# Patient Record
Sex: FEMALE | Employment: FULL TIME | URBAN - METROPOLITAN AREA
[De-identification: names, ages, dates, MRNs, and addresses within clinical notes are randomized per-mention and may not be internally consistent; named-entity substitution may affect disease eponyms.]

---

## 2023-07-06 ENCOUNTER — APPOINTMENT (OUTPATIENT)
Dept: LAB | Facility: LAB | Age: 33
End: 2023-07-06
Payer: COMMERCIAL

## 2023-10-08 ENCOUNTER — TELEPHONE (OUTPATIENT)
Dept: OBSTETRICS AND GYNECOLOGY | Facility: HOSPITAL | Age: 33
End: 2023-10-08
Payer: COMMERCIAL

## 2023-10-08 DIAGNOSIS — L70.0 CYSTIC ACNE VULGARIS: Primary | ICD-10-CM

## 2023-10-08 RX ORDER — TRETINOIN 0.25 MG/G
CREAM TOPICAL NIGHTLY
Qty: 45 G | Refills: 5 | Status: SHIPPED | OUTPATIENT
Start: 2023-10-08 | End: 2024-10-07

## 2023-10-17 ENCOUNTER — OFFICE VISIT (OUTPATIENT)
Dept: PRIMARY CARE | Facility: CLINIC | Age: 33
End: 2023-10-17
Payer: COMMERCIAL

## 2023-10-17 VITALS
BODY MASS INDEX: 21.16 KG/M2 | WEIGHT: 115 LBS | OXYGEN SATURATION: 97 % | HEIGHT: 62 IN | SYSTOLIC BLOOD PRESSURE: 91 MMHG | HEART RATE: 77 BPM | TEMPERATURE: 97.3 F | DIASTOLIC BLOOD PRESSURE: 54 MMHG

## 2023-10-17 DIAGNOSIS — Z00.00 ENCOUNTER FOR WELLNESS EXAMINATION: Primary | ICD-10-CM

## 2023-10-17 DIAGNOSIS — Z23 NEED FOR VACCINATION: ICD-10-CM

## 2023-10-17 DIAGNOSIS — Z30.011 ORAL CONTRACEPTION INITIATION: ICD-10-CM

## 2023-10-17 DIAGNOSIS — F33.41 RECURRENT MAJOR DEPRESSIVE DISORDER, IN PARTIAL REMISSION (CMS-HCC): ICD-10-CM

## 2023-10-17 PROBLEM — J45.20 MILD INTERMITTENT ASTHMA (HHS-HCC): Status: ACTIVE | Noted: 2021-06-17

## 2023-10-17 PROBLEM — I45.6 WPW (WOLFF-PARKINSON-WHITE SYNDROME): Status: RESOLVED | Noted: 2022-10-04 | Resolved: 2023-10-17

## 2023-10-17 PROBLEM — L70.9 ACNE: Status: ACTIVE | Noted: 2023-10-17

## 2023-10-17 PROBLEM — E78.00 HIGH CHOLESTEROL: Status: ACTIVE | Noted: 2023-10-17

## 2023-10-17 PROCEDURE — 1036F TOBACCO NON-USER: CPT | Performed by: INTERNAL MEDICINE

## 2023-10-17 PROCEDURE — 90471 IMMUNIZATION ADMIN: CPT | Performed by: INTERNAL MEDICINE

## 2023-10-17 PROCEDURE — 99213 OFFICE O/P EST LOW 20 MIN: CPT | Performed by: INTERNAL MEDICINE

## 2023-10-17 PROCEDURE — 99385 PREV VISIT NEW AGE 18-39: CPT | Performed by: INTERNAL MEDICINE

## 2023-10-17 PROCEDURE — 90686 IIV4 VACC NO PRSV 0.5 ML IM: CPT | Performed by: INTERNAL MEDICINE

## 2023-10-17 RX ORDER — BUPROPION HYDROCHLORIDE 150 MG/1
150 TABLET ORAL EVERY MORNING
Qty: 90 TABLET | Refills: 3 | Status: SHIPPED | OUTPATIENT
Start: 2023-10-17 | End: 2023-10-17 | Stop reason: SDUPTHER

## 2023-10-17 RX ORDER — BUPROPION HYDROCHLORIDE 300 MG/1
300 TABLET ORAL
COMMUNITY
Start: 2023-06-16 | End: 2023-10-17 | Stop reason: DRUGHIGH

## 2023-10-17 RX ORDER — ALBUTEROL SULFATE 90 UG/1
2 AEROSOL, METERED RESPIRATORY (INHALATION) EVERY 6 HOURS PRN
COMMUNITY

## 2023-10-17 RX ORDER — NORGESTIMATE AND ETHINYL ESTRADIOL 0.25-0.035
1 KIT ORAL DAILY
Qty: 84 TABLET | Refills: 4 | Status: SHIPPED | OUTPATIENT
Start: 2023-10-17 | End: 2024-10-16

## 2023-10-17 RX ORDER — BUPROPION HYDROCHLORIDE 150 MG/1
150 TABLET ORAL EVERY MORNING
Qty: 90 TABLET | Refills: 3 | Status: SHIPPED | OUTPATIENT
Start: 2023-10-17 | End: 2024-10-16

## 2023-10-17 RX ORDER — LORATADINE 10 MG/1
10 TABLET ORAL AS NEEDED
COMMUNITY

## 2023-10-17 RX ORDER — ESCITALOPRAM OXALATE 10 MG/1
10 TABLET ORAL EVERY MORNING
Qty: 90 TABLET | Refills: 3 | Status: SHIPPED | OUTPATIENT
Start: 2023-10-17 | End: 2024-10-16

## 2023-10-17 RX ORDER — ESCITALOPRAM OXALATE 10 MG/1
15 TABLET ORAL
COMMUNITY
Start: 2023-06-16 | End: 2023-10-17 | Stop reason: SDUPTHER

## 2023-10-17 NOTE — PROGRESS NOTES
Subjective   Kassy King is a 33 y.o. female who presents for New Patient Visit.  HPI  Patient is a very pleasant 33-year-old woman patient established with new PCP.  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    Past medical history includes depression, asthma/allergic rhinitis, hyperlipidemia and acne.    She has never been formally tested but is quite allergic to many animals as well as seasonal triggers.  She has an albuterol inhaler which she uses as needed, typically only when coming into contact with an animal as she is sensitive to.  When she was younger she was also on Advair and Singulair but she also had animals in the house.  Also reports suspected mild lactose intolerance.    She has a history of depression, treated since college, never formally taken off medication since remission but when she would run out she would typically have relapse.  She has been on different SSRIs in the past which seem to work but were limited by side effects of sexual function.  She did have some postpartum depression exacerbating her underlying depression and most recently has been on escitalopram 10 mg and her Wellbutrin at that time was increased to 300 mg.  She ran out and has been off of it for the past 2 weeks and thinks she may be experiencing some symptoms of depression including increased sleep, possibly more irritable.  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    She is , had an early first semester miscarriage with her first pregnancy.  Second pregnancy was mostly uncomplicated, had uncomplicated  at term.  Mild gestational hypertension was present which did not require pharmaceutical therapy in addition to the PPD.  She is still nursing/pumping, planning on continuing through 9 to 12 months.  Has Micronor prescription at home but concerned about spotting so she would rather just start combined OCP  "when she is done nursing.  Has not restarted menses postnatally.  Not planning pregnancy in the near future but would be okay with it as she does hope to have another child probably during her fellowship.    , lives at home, [place], with her  8-month-old daughter Barby.  Her  is a , works from home.  They also have a Labradoodle.  Diet is fairly healthy, enjoys running/biking/hiking although unable to do as much as she would like.  No history of any tobacco or drug use.  Light alcohol use, may be serving per week.  Objective   BP 91/54   Pulse 77   Temp 36.3 °C (97.3 °F)   Ht 1.575 m (5' 2\")   Wt 52.2 kg (115 lb)   SpO2 97%   BMI 21.03 kg/m²    Physical Exam  Gen: NAD, pleasant, A&;Ox3  HEENT: PERRL, EOMI, MMM, OP clear  Neck: supple, no thyromegaly, no JVD, normal carotid upstroke  Pulm: lungs CTAB, good air movement  CV: RRR, no m/r/g, 2+ DP pulses  Abd: NABS, soft, NT, ND no HSM  Ext: no peripheral edema  Neuro: CN II-XII intact, no focal sensory or motor deficits, normal reflexes   Assessment/Plan     High cholesterol: Without any other risk factors, reassess after reproductive years; for now focus on lifestyle/behavioral optimization    MDD: Continue escitalopram 10 mg daily, restart Wellbutrin at 150 mg daily    Contraception: Currently nursing, planning to stop in the next 1 to 3 months and prefers to switch to combined OCP instead of starting on progesterone only pill.  Prescription sent to pharmacy.    Acne: Continue topical tretinoin cream    Allergic rhinitis/reactive bronchospasm: Continue as needed antihistamine and albuterol      Health maintenance  -Mammogram: No early screening indicated  -Pap: 8/5/2022, normal, high-risk HPV negative  -DEXA  -Last colonoscopy:  , Repeat:  -Smoking history  -Counseled regarding diet and exercise  -Immunizations: Current  -Followup annually, sooner if needed   Problem List Items Addressed This Visit    None           Scott" MD Sushma

## 2023-12-27 ENCOUNTER — OFFICE VISIT (OUTPATIENT)
Dept: PRIMARY CARE | Facility: CLINIC | Age: 33
End: 2023-12-27
Payer: COMMERCIAL

## 2023-12-27 VITALS
TEMPERATURE: 97.3 F | OXYGEN SATURATION: 97 % | SYSTOLIC BLOOD PRESSURE: 125 MMHG | HEART RATE: 79 BPM | DIASTOLIC BLOOD PRESSURE: 73 MMHG

## 2023-12-27 DIAGNOSIS — H66.91 PERFORATION OF RIGHT TYMPANIC MEMBRANE DUE TO OTITIS MEDIA: Primary | ICD-10-CM

## 2023-12-27 DIAGNOSIS — H72.91 PERFORATION OF RIGHT TYMPANIC MEMBRANE DUE TO OTITIS MEDIA: Primary | ICD-10-CM

## 2023-12-27 DIAGNOSIS — J45.20 MILD INTERMITTENT ASTHMA WITHOUT COMPLICATION (HHS-HCC): ICD-10-CM

## 2023-12-27 DIAGNOSIS — M25.512 ACUTE PAIN OF LEFT SHOULDER: ICD-10-CM

## 2023-12-27 PROCEDURE — 1036F TOBACCO NON-USER: CPT | Performed by: INTERNAL MEDICINE

## 2023-12-27 PROCEDURE — 99213 OFFICE O/P EST LOW 20 MIN: CPT | Performed by: INTERNAL MEDICINE

## 2023-12-27 NOTE — PATIENT INSTRUCTIONS
Twice daily, in each nostril, spray 1-2 doses of Afrin (oxymetazoline) followed by nasal sinus wash (squeeze bottle or Bettina pot, i.e. NeilMed) followed by 1 spray in each nostril of nasal inhaled corticosteroid (i.e. Flonase, Nasacort, fluticasone, mometasone etc.)  After the first 3 days, STOP Afrin and continue the sinus wash and nasal steroid until symptoms are improved.    Tympanic membrane should heal on its own and as it does hearing should return to normal.  Call with any questions or if it does not seem to be improving.  Avoid swimming and keep a cottonball to protect excess water from getting in while bathing.    For the shoulder, ice, rest and NSAIDs for now.  Let me know if it is not improving.

## 2023-12-27 NOTE — PROGRESS NOTES
Subjective   Kassy King is a 33 y.o. female who presents for Hearing Problem and Shoulder Pain.  HPI  Developed upper respiratory infection about a week or more ago.  Initially with sore throat, cough and congestion.  On 24 December woke up with a lot of right ear pain and pressure, tried different self treatments without any success.  She went to bed and woke up with complete resolution of pain but some drainage of fluid, clear with traces of pink, from the right ear and has since had decreased hearing on that side.  No vertigo or tinnitus.  Pains and swelling have not recurred.    She also has some left shoulder pain, went to a golf range with her family and swung the club into the ground and felt something pop in and out over the lateral upper left arm/shoulder and has had persistent pain with certain motions ever since.  No visible bruising or injury, range of motion is fairly normal and minimal functional limitations.  Objective   /73   Pulse 79   Temp 36.3 °C (97.3 °F)   SpO2 97%    Physical Exam  NAD, some nasal congestion  Left otic canal and tympanic membrane are normal  Right otic canal is normal, tympanic membrane appears intact with a slight central vertical linear scar approximately 20% of the total diameter with no purulent drainage or middle ear bulging or purulence or erythema  Left shoulder exam is positive for passive arc and Neer's sign, otherwise fairly normal.  Assessment/Plan     URI with otitis media and perforation of right tympanic membrane:  -Clinically improving, recommend nasal sinus wash and Flonase for continued improvement  -No current signs of acute otitis and tympanic membrane seems to be healing  -Monitor for worsening signs or lack of hearing improvement    Left shoulder pain: Suspect tendinitis with impingement or possibly partial rotator cuff tear.  Labral tear or partial separation also possibilities although symptoms are relatively mild.  Recommend rest ice and  NSAIDs for now.    Problem List Items Addressed This Visit    None           Scott Ordaz MD

## 2023-12-30 ENCOUNTER — PATIENT MESSAGE (OUTPATIENT)
Dept: PRIMARY CARE | Facility: CLINIC | Age: 33
End: 2023-12-30
Payer: COMMERCIAL

## 2023-12-30 DIAGNOSIS — M25.512 ACUTE PAIN OF LEFT SHOULDER: Primary | ICD-10-CM

## 2024-01-15 ENCOUNTER — HOSPITAL ENCOUNTER (OUTPATIENT)
Dept: RADIOLOGY | Facility: HOSPITAL | Age: 34
Discharge: HOME | End: 2024-01-15
Payer: COMMERCIAL

## 2024-01-15 ENCOUNTER — OFFICE VISIT (OUTPATIENT)
Dept: ORTHOPEDIC SURGERY | Facility: HOSPITAL | Age: 34
End: 2024-01-15
Payer: COMMERCIAL

## 2024-01-15 DIAGNOSIS — M25.512 ACUTE PAIN OF LEFT SHOULDER: ICD-10-CM

## 2024-01-15 DIAGNOSIS — M25.512 LEFT SHOULDER PAIN, UNSPECIFIED CHRONICITY: ICD-10-CM

## 2024-01-15 PROCEDURE — 73030 X-RAY EXAM OF SHOULDER: CPT | Mod: LT

## 2024-01-15 PROCEDURE — 99213 OFFICE O/P EST LOW 20 MIN: CPT | Performed by: ORTHOPAEDIC SURGERY

## 2024-01-15 PROCEDURE — 99203 OFFICE O/P NEW LOW 30 MIN: CPT | Performed by: ORTHOPAEDIC SURGERY

## 2024-01-15 PROCEDURE — 73030 X-RAY EXAM OF SHOULDER: CPT | Mod: LEFT SIDE | Performed by: RADIOLOGY

## 2024-01-15 PROCEDURE — 1036F TOBACCO NON-USER: CPT | Performed by: ORTHOPAEDIC SURGERY

## 2024-01-15 NOTE — PROGRESS NOTES
Injection of left shoulder she has 3 weeks of left shoulder pain primarily posteriorly and anteriorly.  This started when she was at top golf and ground at the club felt her shoulder pop.  She has had pain since then.  It is partially improved.  No history of prior problems with left shoulder no neck pain no paresthesias no right shoulder symptoms.  The patient's past medical history social history and family history as well as a complete 30 point review of systems have been documented on the patient intake form and scanned into the electronic medical record.  Unless noted in the history of present illness, all other systems have been reviewed and are negative for complaints.  The patient is pleasant and cooperative.  The patient is alert and oriented ×3.  Auditory function is intact.  The patient is a good historian.  The patient is not in acute distress.  Eye exam significant for nonicteric sclera, intact ocular muscle movement.  Breathing is rhythmic symmetric and nonlabored.  Left shoulder integument intact no lesions scars laceration abrasions or contusions left radial pulse palpable no peripheral edema  strength 5/5.  Touch sensation over the shoulder arm forearm and hand all intact.  Active range of motion elevation 180 external rotation and abduction 95 with pain.  No apprehension.  External rotation at the side 60 degrees internal rotation T10.  Motor power abduction internal and external rotation 5/5.   strength 5/5.    X-rays show preservation subacromial glenohumeral space no fracture dislocation subluxation or arthritic degenerative changes.     Left shoulder strain    Patient sustained a strain injury to her shoulder.  I do not think she has dislocated but may have partially subluxed the shoulder and has sequelae associated with this.  Prognosis for this injury is excellent and should recover well.  I recommended use of ibuprofen and limited activity modification and repeat examination in 2 to  3 weeks.  This was dictated using voice recognition software and not corrected for grammatical or spelling errors.

## 2024-02-12 ENCOUNTER — APPOINTMENT (OUTPATIENT)
Dept: ORTHOPEDIC SURGERY | Facility: HOSPITAL | Age: 34
End: 2024-02-12
Payer: COMMERCIAL

## 2024-02-23 ENCOUNTER — TELEPHONE (OUTPATIENT)
Dept: OBSTETRICS AND GYNECOLOGY | Facility: HOSPITAL | Age: 34
End: 2024-02-23
Payer: COMMERCIAL

## 2024-02-23 DIAGNOSIS — L70.0 CYSTIC ACNE VULGARIS: Primary | ICD-10-CM

## 2024-02-23 RX ORDER — TRETINOIN 0.5 MG/G
1 CREAM TOPICAL NIGHTLY
Qty: 20 G | Refills: 1 | Status: SHIPPED | OUTPATIENT
Start: 2024-02-23 | End: 2025-02-22

## 2024-04-11 ENCOUNTER — LAB (OUTPATIENT)
Dept: LAB | Facility: LAB | Age: 34
End: 2024-04-11
Payer: COMMERCIAL

## 2024-04-11 LAB — COTININE UR QL SCN: NEGATIVE

## 2024-05-24 ENCOUNTER — TELEPHONE (OUTPATIENT)
Dept: OBSTETRICS AND GYNECOLOGY | Facility: HOSPITAL | Age: 34
End: 2024-05-24

## 2024-05-24 DIAGNOSIS — J02.0 STREP THROAT: Primary | ICD-10-CM

## 2024-05-24 RX ORDER — AMOXICILLIN 500 MG/1
500 TABLET, FILM COATED ORAL 2 TIMES DAILY
Qty: 20 TABLET | Refills: 0 | Status: SHIPPED | OUTPATIENT
Start: 2024-05-24 | End: 2024-06-03

## 2024-07-24 ENCOUNTER — OFFICE VISIT (OUTPATIENT)
Dept: PRIMARY CARE | Facility: CLINIC | Age: 34
End: 2024-07-24
Payer: COMMERCIAL

## 2024-07-24 VITALS
WEIGHT: 120.8 LBS | HEART RATE: 67 BPM | DIASTOLIC BLOOD PRESSURE: 68 MMHG | HEIGHT: 63 IN | OXYGEN SATURATION: 98 % | BODY MASS INDEX: 21.4 KG/M2 | SYSTOLIC BLOOD PRESSURE: 113 MMHG

## 2024-07-24 DIAGNOSIS — F33.42 RECURRENT MAJOR DEPRESSIVE DISORDER, IN FULL REMISSION (CMS-HCC): ICD-10-CM

## 2024-07-24 DIAGNOSIS — J45.20 MILD INTERMITTENT ASTHMA WITHOUT COMPLICATION (HHS-HCC): ICD-10-CM

## 2024-07-24 DIAGNOSIS — L70.9 ACNE, UNSPECIFIED ACNE TYPE: ICD-10-CM

## 2024-07-24 DIAGNOSIS — F33.42 RECURRENT MAJOR DEPRESSIVE DISORDER, IN FULL REMISSION (CMS-HCC): Primary | ICD-10-CM

## 2024-07-24 DIAGNOSIS — L70.9 ACNE, UNSPECIFIED ACNE TYPE: Primary | ICD-10-CM

## 2024-07-24 PROCEDURE — 99213 OFFICE O/P EST LOW 20 MIN: CPT | Performed by: INTERNAL MEDICINE

## 2024-07-24 PROCEDURE — 1036F TOBACCO NON-USER: CPT | Performed by: INTERNAL MEDICINE

## 2024-07-24 PROCEDURE — 3008F BODY MASS INDEX DOCD: CPT | Performed by: INTERNAL MEDICINE

## 2024-07-24 ASSESSMENT — PATIENT HEALTH QUESTIONNAIRE - PHQ9
SUM OF ALL RESPONSES TO PHQ9 QUESTIONS 1 AND 2: 0
SUM OF ALL RESPONSES TO PHQ9 QUESTIONS 1 AND 2: 0
1. LITTLE INTEREST OR PLEASURE IN DOING THINGS: NOT AT ALL
1. LITTLE INTEREST OR PLEASURE IN DOING THINGS: NOT AT ALL
2. FEELING DOWN, DEPRESSED OR HOPELESS: NOT AT ALL
2. FEELING DOWN, DEPRESSED OR HOPELESS: NOT AT ALL

## 2024-07-24 ASSESSMENT — ENCOUNTER SYMPTOMS
OCCASIONAL FEELINGS OF UNSTEADINESS: 0
DEPRESSION: 0
LOSS OF SENSATION IN FEET: 0

## 2024-07-24 NOTE — PROGRESS NOTES
Subjective   Patient ID: Kassy King is a 33 y.o. female who presents for Follow-up.      Past medical history includes depression, asthma/allergic rhinitis, hyperlipidemia and acne.     Shoulder and ear issues resolved.     She was restarted on Wellbutrin at her initial visit in OCT 2023.  Has been doing very well from a mental health perspective.  Moods have been stable, no SI/HI, no appetite or sleep habit concerns, managing her workload and home-life without any difficulty.  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  See initial visit for details of depression history including MDD and PPD.     She has never been formally tested but is quite allergic to many animals as well as seasonal triggers.  Fairly chronic rhinitis.  She has an albuterol inhaler which she uses as needed, typically only when coming into contact with an animal as she is sensitive to.  When she was younger she was also on Advair and Singulair but she also had animals in the house.  Also reports suspected mild lactose intolerance.      She is , had an early first semester miscarriage with her first pregnancy.  Second pregnancy was uncomplicated, had uncomplicated  at term.  Mild gestational hypertension was present which did not require pharmaceutical therapy and she did have some PPD.  Currently relying on timing for contraception, would be okay with pregnancy and may start trying again in the relatively near future.  Did not go back on OCP.  She is taking prenatal vitamins.     , lives at home, [place], with her  and daughter Barby.  Her  is a , works from home.  They also have a [employer].  Diet is fairly healthy, enjoys running/biking/hiking.  No history of any tobacco or drug use.  Light alcohol use, averages 0-1 servings per week.  Objective   Physical Exam    /68 (BP Location: Right arm, Patient Position: Sitting, BP Cuff Size: Adult)   Pulse 67   Ht  "1.6 m (5' 3\")   Wt 54.8 kg (120 lb 12.8 oz)   LMP 07/03/2024 (Exact Date)   SpO2 98%   BMI 21.40 kg/m²      Gen: NAD, pleasant, A&;Ox3  HEENT: PERRL, EOMI, MMM, OP clear  Neck: supple, no thyromegaly, no JVD, normal carotid upstroke  Pulm: lungs CTAB, good air movement  CV: RRR, no m/r/g, 2+ DP pulses  Abd: NABS, soft, NT, ND no HSM  Ext: no peripheral edema  Neuro: CN II-XII intact, no focal sensory or motor deficits, normal reflexes           Assessment/Plan     MDD in full remission: Continue escitalopram 10 mg daily and Wellbutrin 150 mg daily     Acne: Continue topical tretinoin cream, referred to derm for this and scarring concerns     Allergic rhinitis/reactive bronchospasm: Continue as needed antihistamine and albuterol; can also try nasal ICS for rhinitis     Health maintenance  -Mammogram: No early screening indicated  -Pap: 8/5/2022, normal, high-risk HPV negative  -DEXA  -Last colonoscopy:  , Repeat:  -Smoking history  -Counseled regarding diet and exercise  -Immunizations: Current  -Followup annually, sooner if needed       Scott Ordaz MD         "

## 2024-07-24 NOTE — PROGRESS NOTES
Subjective   Kassy King is a 33 y.o. female who presents for No chief complaint on file..  HPI    Past medical history includes depression, asthma/allergic rhinitis, hyperlipidemia and acne.    Shoulder and ear issues resolved.    She was restarted on Wellbutrin at her initial visit in OCT 2023.  Has been doing very well from a mental health perspective.  Moods have been stable, no SI/HI, no appetite or sleep habit concerns, managing her workload and home-life without any difficulty.  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  See initial visit for details of depression history including MDD and PPD.    She has never been formally tested but is quite allergic to many animals as well as seasonal triggers.  Fairly chronic rhinitis.  She has an albuterol inhaler which she uses as needed, typically only when coming into contact with an animal as she is sensitive to.  When she was younger she was also on Advair and Singulair but she also had animals in the house.  Also reports suspected mild lactose intolerance.        She is , had an early first semester miscarriage with her first pregnancy.  Second pregnancy was uncomplicated, had uncomplicated  at term.  Mild gestational hypertension was present which did not require pharmaceutical therapy and she did have some PPD.  Currently relying on timing for contraception, would be okay with pregnancy and may start trying again in the relatively near future.  Did not go back on OCP.  She is taking prenatal vitamins.    , lives at home, [place], with her  and daughter Barby.  Her  is a , works from home.  They also have a Labradoodle.  Diet is fairly healthy, enjoys running/biking/hiking.  No history of any tobacco or drug use.  Light alcohol use, averages 0-1 servings per week.  Objective   LMP 2024 (Exact Date)    Physical Exam  Gen: NAD, pleasant, A&;Ox3  HEENT: PERRL, EOMI, MMM, OP  clear  Neck: supple, no thyromegaly, no JVD, normal carotid upstroke  Pulm: lungs CTAB, good air movement  CV: RRR, no m/r/g, 2+ DP pulses  Abd: NABS, soft, NT, ND no HSM  Ext: no peripheral edema  Neuro: CN II-XII intact, no focal sensory or motor deficits, normal reflexes   Assessment/Plan     High cholesterol: Without any other risk factors, reassess after reproductive years; for now focus on lifestyle/behavioral optimization    MDD in full remission: Continue escitalopram 10 mg daily and Wellbutrin 150 mg daily    Acne: Continue topical tretinoin cream, referred to derm for this and scarring concerns    Allergic rhinitis/reactive bronchospasm: Continue as needed antihistamine and albuterol; can also try nasal ICS for rhinitis    Health maintenance  -Mammogram: No early screening indicated  -Pap: 8/5/2022, normal, high-risk HPV negative  -DEXA  -Last colonoscopy:  , Repeat:  -Smoking history  -Counseled regarding diet and exercise  -Immunizations: Current  -Followup annually, sooner if needed   Problem List Items Addressed This Visit       Acne - Primary    Relevant Orders    Referral to Dermatology            Scott Ordaz MD

## 2024-10-13 DIAGNOSIS — F33.41 RECURRENT MAJOR DEPRESSIVE DISORDER, IN PARTIAL REMISSION (CMS-HCC): ICD-10-CM

## 2024-10-14 RX ORDER — BUPROPION HYDROCHLORIDE 150 MG/1
150 TABLET ORAL EVERY MORNING
Qty: 90 TABLET | Refills: 3 | Status: SHIPPED | OUTPATIENT
Start: 2024-10-14 | End: 2025-10-14

## 2024-10-14 RX ORDER — ESCITALOPRAM OXALATE 10 MG/1
10 TABLET ORAL DAILY
Qty: 90 TABLET | Refills: 3 | Status: SHIPPED | OUTPATIENT
Start: 2024-10-14

## 2024-10-15 DIAGNOSIS — Z3A.01 7 WEEKS GESTATION OF PREGNANCY (HHS-HCC): Primary | ICD-10-CM

## 2024-10-17 ENCOUNTER — HOSPITAL ENCOUNTER (OUTPATIENT)
Dept: RADIOLOGY | Facility: CLINIC | Age: 34
Discharge: HOME | End: 2024-10-17
Payer: COMMERCIAL

## 2024-10-17 DIAGNOSIS — Z3A.01 7 WEEKS GESTATION OF PREGNANCY (HHS-HCC): ICD-10-CM

## 2024-10-17 PROCEDURE — 76801 OB US < 14 WKS SINGLE FETUS: CPT

## 2024-10-25 ENCOUNTER — TELEPHONE (OUTPATIENT)
Dept: UROLOGY | Facility: HOSPITAL | Age: 34
End: 2024-10-25
Payer: COMMERCIAL

## 2024-10-25 DIAGNOSIS — R11.2 NAUSEA AND VOMITING, UNSPECIFIED VOMITING TYPE: Primary | ICD-10-CM

## 2024-10-25 RX ORDER — ONDANSETRON 4 MG/1
8 TABLET, FILM COATED ORAL EVERY 8 HOURS PRN
Qty: 20 TABLET | Refills: 6 | Status: SHIPPED | OUTPATIENT
Start: 2024-10-25 | End: 2024-11-24

## 2024-11-04 ENCOUNTER — APPOINTMENT (OUTPATIENT)
Dept: OBSTETRICS AND GYNECOLOGY | Facility: HOSPITAL | Age: 34
End: 2024-11-04
Payer: COMMERCIAL

## 2024-11-11 ENCOUNTER — INITIAL PRENATAL (OUTPATIENT)
Dept: OBSTETRICS AND GYNECOLOGY | Facility: HOSPITAL | Age: 34
End: 2024-11-11
Payer: COMMERCIAL

## 2024-11-11 ENCOUNTER — LAB (OUTPATIENT)
Dept: LAB | Facility: LAB | Age: 34
End: 2024-11-11
Payer: COMMERCIAL

## 2024-11-11 VITALS — DIASTOLIC BLOOD PRESSURE: 78 MMHG | SYSTOLIC BLOOD PRESSURE: 122 MMHG | BODY MASS INDEX: 22.14 KG/M2 | WEIGHT: 125 LBS

## 2024-11-11 DIAGNOSIS — Z86.79 HISTORY OF POSTPARTUM GESTATIONAL HYPERTENSION: ICD-10-CM

## 2024-11-11 DIAGNOSIS — O09.299 HX OF MATERNAL LACERATION, 3RD DEGREE, CURRENTLY PREGNANT (HHS-HCC): ICD-10-CM

## 2024-11-11 DIAGNOSIS — O99.511 ASTHMA AFFECTING PREGNANCY IN FIRST TRIMESTER (HHS-HCC): ICD-10-CM

## 2024-11-11 DIAGNOSIS — J45.909 ASTHMA AFFECTING PREGNANCY IN FIRST TRIMESTER (HHS-HCC): ICD-10-CM

## 2024-11-11 DIAGNOSIS — Z3A.10 10 WEEKS GESTATION OF PREGNANCY (HHS-HCC): ICD-10-CM

## 2024-11-11 DIAGNOSIS — F32.A DEPRESSION AFFECTING PREGNANCY IN FIRST TRIMESTER, ANTEPARTUM (HHS-HCC): ICD-10-CM

## 2024-11-11 DIAGNOSIS — Z87.59 HISTORY OF POSTPARTUM GESTATIONAL HYPERTENSION: ICD-10-CM

## 2024-11-11 DIAGNOSIS — O99.341 DEPRESSION AFFECTING PREGNANCY IN FIRST TRIMESTER, ANTEPARTUM (HHS-HCC): ICD-10-CM

## 2024-11-11 DIAGNOSIS — Z86.79 HISTORY OF WOLFF-PARKINSON-WHITE (WPW) SYNDROME: Primary | ICD-10-CM

## 2024-11-11 LAB
ABO GROUP (TYPE) IN BLOOD: NORMAL
ANTIBODY SCREEN: NORMAL
ERYTHROCYTE [DISTWIDTH] IN BLOOD BY AUTOMATED COUNT: 13.1 % (ref 11.5–14.5)
EST. AVERAGE GLUCOSE BLD GHB EST-MCNC: 100 MG/DL
HBA1C MFR BLD: 5.1 %
HBV CORE AB SER QL: NONREACTIVE
HBV SURFACE AB SER-ACNC: 541.4 MIU/ML
HBV SURFACE AG SERPL QL IA: NONREACTIVE
HCT VFR BLD AUTO: 38.2 % (ref 36–46)
HCV AB SER QL: NONREACTIVE
HGB BLD-MCNC: 12.5 G/DL (ref 12–16)
HIV 1+2 AB+HIV1 P24 AG SERPL QL IA: NONREACTIVE
MCH RBC QN AUTO: 29.2 PG (ref 26–34)
MCHC RBC AUTO-ENTMCNC: 32.7 G/DL (ref 32–36)
MCV RBC AUTO: 89 FL (ref 80–100)
NRBC BLD-RTO: 0 /100 WBCS (ref 0–0)
PLATELET # BLD AUTO: 331 X10*3/UL (ref 150–450)
RBC # BLD AUTO: 4.28 X10*6/UL (ref 4–5.2)
REFLEX ADDED, ANEMIA PANEL: NORMAL
RH FACTOR (ANTIGEN D): NORMAL
RUBV IGG SERPL IA-ACNC: 0.9 IA
RUBV IGG SERPL QL IA: NORMAL
TREPONEMA PALLIDUM IGG+IGM AB [PRESENCE] IN SERUM OR PLASMA BY IMMUNOASSAY: NONREACTIVE
WBC # BLD AUTO: 9.8 X10*3/UL (ref 4.4–11.3)

## 2024-11-11 PROCEDURE — 86317 IMMUNOASSAY INFECTIOUS AGENT: CPT

## 2024-11-11 PROCEDURE — 83036 HEMOGLOBIN GLYCOSYLATED A1C: CPT

## 2024-11-11 PROCEDURE — 86780 TREPONEMA PALLIDUM: CPT

## 2024-11-11 PROCEDURE — 86901 BLOOD TYPING SEROLOGIC RH(D): CPT

## 2024-11-11 PROCEDURE — 85027 COMPLETE CBC AUTOMATED: CPT

## 2024-11-11 PROCEDURE — 87340 HEPATITIS B SURFACE AG IA: CPT

## 2024-11-11 PROCEDURE — 86900 BLOOD TYPING SEROLOGIC ABO: CPT

## 2024-11-11 PROCEDURE — 87491 CHLMYD TRACH DNA AMP PROBE: CPT | Performed by: STUDENT IN AN ORGANIZED HEALTH CARE EDUCATION/TRAINING PROGRAM

## 2024-11-11 PROCEDURE — 83021 HEMOGLOBIN CHROMOTOGRAPHY: CPT

## 2024-11-11 PROCEDURE — 86706 HEP B SURFACE ANTIBODY: CPT

## 2024-11-11 PROCEDURE — 36415 COLL VENOUS BLD VENIPUNCTURE: CPT

## 2024-11-11 PROCEDURE — 83020 HEMOGLOBIN ELECTROPHORESIS: CPT | Performed by: STUDENT IN AN ORGANIZED HEALTH CARE EDUCATION/TRAINING PROGRAM

## 2024-11-11 PROCEDURE — 0500F INITIAL PRENATAL CARE VISIT: CPT | Performed by: STUDENT IN AN ORGANIZED HEALTH CARE EDUCATION/TRAINING PROGRAM

## 2024-11-11 PROCEDURE — 87389 HIV-1 AG W/HIV-1&-2 AB AG IA: CPT

## 2024-11-11 PROCEDURE — 86850 RBC ANTIBODY SCREEN: CPT

## 2024-11-11 PROCEDURE — 86803 HEPATITIS C AB TEST: CPT

## 2024-11-11 PROCEDURE — 86704 HEP B CORE ANTIBODY TOTAL: CPT

## 2024-11-11 PROCEDURE — 87086 URINE CULTURE/COLONY COUNT: CPT | Performed by: STUDENT IN AN ORGANIZED HEALTH CARE EDUCATION/TRAINING PROGRAM

## 2024-11-11 SDOH — ECONOMIC STABILITY: FOOD INSECURITY: WITHIN THE PAST 12 MONTHS, THE FOOD YOU BOUGHT JUST DIDN'T LAST AND YOU DIDN'T HAVE MONEY TO GET MORE.: NEVER TRUE

## 2024-11-11 SDOH — ECONOMIC STABILITY: FOOD INSECURITY: WITHIN THE PAST 12 MONTHS, YOU WORRIED THAT YOUR FOOD WOULD RUN OUT BEFORE YOU GOT MONEY TO BUY MORE.: NEVER TRUE

## 2024-11-11 ASSESSMENT — PATIENT HEALTH QUESTIONNAIRE - PHQ9
SUM OF ALL RESPONSES TO PHQ9 QUESTIONS 1 & 2: 0
1. LITTLE INTEREST OR PLEASURE IN DOING THINGS: NOT AT ALL
2. FEELING DOWN, DEPRESSED OR HOPELESS: NOT AT ALL

## 2024-11-11 ASSESSMENT — EDINBURGH POSTNATAL DEPRESSION SCALE (EPDS)
I HAVE BEEN SO UNHAPPY THAT I HAVE HAD DIFFICULTY SLEEPING: NOT AT ALL
I HAVE FELT SCARED OR PANICKY FOR NO GOOD REASON: NO, NOT AT ALL
I HAVE BLAMED MYSELF UNNECESSARILY WHEN THINGS WENT WRONG: NOT VERY OFTEN
I HAVE BEEN ANXIOUS OR WORRIED FOR NO GOOD REASON: YES, VERY OFTEN
TOTAL SCORE: 6
I HAVE LOOKED FORWARD WITH ENJOYMENT TO THINGS: AS MUCH AS I EVER DID
I HAVE FELT SAD OR MISERABLE: NOT VERY OFTEN
I HAVE BEEN SO UNHAPPY THAT I HAVE BEEN CRYING: NO, NEVER
I HAVE BEEN ABLE TO LAUGH AND SEE THE FUNNY SIDE OF THINGS: AS MUCH AS I ALWAYS COULD
THINGS HAVE BEEN GETTING ON TOP OF ME: NO, MOST OF THE TIME I HAVE COPED QUITE WELL
THE THOUGHT OF HARMING MYSELF HAS OCCURRED TO ME: NEVER

## 2024-11-11 NOTE — PROGRESS NOTES
Kassy King is a 34 y.o.  female who is here for an initial OB visit.    Pregnancy notable for:  - h/o  after SROM at 38wks , 6#1oz, 3rd degree  - history of pp gHTN  - depression, on escitalopram 10mg daily & Wellbutrin 150mg daily  - mild intermittent asthma, prn antihistamine and albuterol   - WPW, s/p ablation in . Baseline EKG wnl. S/p TTE during prior pregnancy, wnl EF 60-65%, moderately dilated riaght atrium     Previous pregnancy history:   Prior : denies  History of 4th degree laceration: denies  History of shoulder dystocia: denies  History of Hypertensive disorders including pre-eclampsia or gestational hypertension: denies  History of gestational diabetes: denies  Did you have a previous baby with a GBS Infection? denies  History of hemorrhage or bleeding concerns: denies  Thyroid Disease: denies  History of chronic hypertension: denies  History of pre-existing diabetes: denies    Past med hx and past surg hx reviewed and notable for: as above  Social History     Substance and Sexual Activity   Sexual Activity Yes    Partners: Male   Occupation: UroGyn fellow at   Multivitamin with Folic acid:   PapHx: 2022 NILM HPV neg    Objective   /78   Wt 56.7 kg (125 lb)   LMP 2024   BMI 22.14 kg/m²   Physical Exam  Constitutional:       Appearance: Normal appearance.   Genitourinary:      Genitourinary Comments: BSUS with good blood flow through fetal heart and cord   HENT:      Head: Normocephalic and atraumatic.      Mouth/Throat:      Mouth: Mucous membranes are moist.   Eyes:      Extraocular Movements: Extraocular movements intact.      Conjunctiva/sclera: Conjunctivae normal.      Pupils: Pupils are equal, round, and reactive to light.   Pulmonary:      Effort: Pulmonary effort is normal.   Abdominal:      General: Abdomen is flat.      Palpations: Abdomen is soft.   Musculoskeletal:         General: Normal range of motion.      Cervical back: Normal range  of motion.   Neurological:      General: No focal deficit present.      Mental Status: She is alert.   Skin:     General: Skin is warm and dry.   Psychiatric:         Mood and Affect: Mood normal.         Behavior: Behavior normal.         Thought Content: Thought content normal.         Judgment: Judgment normal.       Orders Placed This Encounter   Procedures    Urine culture     Order Specific Question:   Release result to MyChart     Answer:   Immediate [1]    US MAC OB imaging order     Standing Status:   Future     Standing Expiration Date:   11/11/2025     Order Specific Question:   Reason for exam:     Answer:   NT     Order Specific Question:   Radiologist to Determine Optimal Study     Answer:   Yes     Order Specific Question:   Release result to MyChart     Answer:   Immediate [1]     Order Specific Question:   Is this exam part of a Research Study? If Yes, link this order to the research study     Answer:   No    C. trachomatis / N. gonorrhoeae, Amplified     Order Specific Question:   Release result to MyChart     Answer:   Immediate    CBC Anemia Panel With Reflex,Pregnancy     Standing Status:   Future     Standing Expiration Date:   11/11/2025     Order Specific Question:   Release result to MyChart     Answer:   Immediate [1]    Hemoglobin A1C     Standing Status:   Future     Standing Expiration Date:   11/11/2025     Order Specific Question:   Release result to MyChart     Answer:   Immediate [1]    Hemoglobin Identification with Path Review     Standing Status:   Future     Standing Expiration Date:   11/11/2025     Order Specific Question:   Release result to MyChart     Answer:   Immediate [1]    Hepatitis B Surface Antibody     Standing Status:   Future     Standing Expiration Date:   11/11/2025     Order Specific Question:   Release result to MyChart     Answer:   Immediate [1]    Hepatitis B surface Ag     Standing Status:   Future     Standing Expiration Date:   11/11/2025     Order  Specific Question:   Release result to MyChart     Answer:   Immediate [1]    Hepatitis B Core Antibody, Total     Standing Status:   Future     Standing Expiration Date:   11/11/2025     Order Specific Question:   Release result to MyChart     Answer:   Immediate [1]    Hepatitis C Antibody     Standing Status:   Future     Standing Expiration Date:   11/11/2025     Order Specific Question:   Release result to MyChart     Answer:   Immediate [1]    HIV 1/2 Antigen/Antibody Screen with Reflex to Confirmation     Standing Status:   Future     Standing Expiration Date:   11/11/2025     Order Specific Question:   Release result to MyChart     Answer:   Immediate [1]    Syphilis Screen with Reflex     Standing Status:   Future     Standing Expiration Date:   11/11/2025     Order Specific Question:   Release result to MyChart     Answer:   Immediate [1]    Type And Screen     Standing Status:   Future     Standing Expiration Date:   11/11/2025     Order Specific Question:   Release result to MyChart     Answer:   Immediate [1]    Rubella IgG     Standing Status:   Future     Standing Expiration Date:   11/11/2025     Order Specific Question:   Release result to MyChart     Answer:   Immediate [1]    [facility] Prequel Prenatal Screen     Standing Status:   Future     Number of Occurrences:   1     Standing Expiration Date:   11/11/2025     Order Specific Question:   Patient Ethnicity     Answer:   Northern  e.g. English, Cymro     Order Specific Question:   Pregnant     Answer:   Yes     Order Specific Question:   Due Date     Answer:   6/4/2025     Order Specific Question:   First Pregnancy     Answer:   No     Order Specific Question:   Pregnancy type     Answer:   Stout (or unknown)     Order Specific Question:   Common aneuploidy, chromosome 13, 18, 21 (Z36.0)     Answer:   Yes     Order Specific Question:   Include sex chromosome analysis     Answer:   Yes     Order Specific Question:   Microdeletions,  hernandez only     Answer:   No     Order Specific Question:   Expanded aneuploidy, hernandez only     Answer:   No     Order Specific Question:   Clinical indications     Answer:   Supervision of other normal pregnancy Z34.80, Z34.81, Z34.82, Z34.83     Order Specific Question:   Billing Information     Answer:   Bill to insurance - If possible, attach a copy of the patient's insurance card     Order Specific Question:   Relationship to Policy Owner     Answer:   Self     Order Specific Question:   Patient e-mail address     Answer:   [email or web address]     Order Specific Question:   Patient's phone number     Answer:   [phone]     Order Specific Question:   Authorized Representative     Answer:   By providing the below contact, I confirm that the patient has expressly consented to [facility] sharing the patients protected health information, including screening results and billing information, with the person listed upon request.    ECG 12 lead (Ancillary Performed)     Standing Status:   Future     Standing Expiration Date:   11/11/2025     Order Specific Question:   Reason for Exam:     Answer:   history of WPW s/p ablation     Problem List Items Addressed This Visit       History of Omari-Parkinson-White (WPW) syndrome - Primary    Overview     s/p ablation in 2010. Baseline EKG wnl. S/p TTE during prior pregnancy, wnl EF 60-65%, moderately dilated riaght atrium.  Plan for baseline EKG.          10 weeks gestation of pregnancy ([facility])    Overview     Desired provider in labor: [] CNM  [x] Physician  [x] Blood Products: [x] Yes, accepts [] No, needs counseling  [x] Initial BMI: Could not be calculated   [] Prenatal Labs: ordered 11/11  [x] Cervical Cancer Screening up to date  [] Rh status:   [x] Genetic Screening:  ordered 11/11  [] NT US: (11-13 wks), ordered 11/11  [] Baby ASA (if indicated): had pp gHTN in P2  [] Pregnancy dated by: LMP    [] Anatomy US: (19-20 wks)  [] Federal Sterilization consent  "signed (if indicated):  [] 1hr GCT at 24-28wks:  [] Rhogam (if indicated):   [] Fetal Surveillance (if indicated):  [] Tdap (27-32 wks, may be given up to 36 wks if initial window missed):   [] RSV (32-36 wks) (Sept. to end of Jan):   [] Flu Vaccine:    [] Breastfeeding:  [] Postpartum Birth control method:   [] GBS at 36 - 37 wks:  [] 39 weeks discussion of IOL vs. Expectant management:  [] Mode of delivery ( anticipated ): vaginal         Relevant Orders    ECG 12 lead (Ancillary Performed)    C. trachomatis / N. gonorrhoeae, Amplified    CBC Anemia Panel With Reflex,Pregnancy    Hemoglobin A1C    Hemoglobin Identification with Path Review    Hepatitis B Surface Antibody    Hepatitis B surface Ag    Hepatitis B Core Antibody, Total    Hepatitis C Antibody    HIV 1/2 Antigen/Antibody Screen with Reflex to Confirmation    Syphilis Screen with Reflex    Type And Screen    Urine culture    Rubella IgG    US MAC OB imaging order    [facility] Prequel Prenatal Screen    Follow Up In Obstetrics    Asthma affecting pregnancy in first trimester ([facility])    Overview     Triggered by allergies, pet dander.   prn antihistamine and albuterol.         Depression affecting pregnancy in first trimester, antepartum ([facility])    Overview     on escitalopram 10mg daily & Wellbutrin 150mg daily         Hx of maternal laceration, 3rd degree, currently pregnant ([facility])    Overview     Was told she had a \"big 2nd degree\" but she is concerned it was a 3rd.  Discussed option of having UroGyn do her perineal repair this time if that makes her feel more comfortable.          History of postpartum gestational hypertension      Prenatal labs ordered. NT imaging ordered.   Thank you for coming to your OB visit for your pregnancy. Follow up in 4 weeks.     Enedina Kay MD    "

## 2024-11-12 LAB
BACTERIA UR CULT: NORMAL
C TRACH RRNA SPEC QL NAA+PROBE: NEGATIVE
HEMOGLOBIN A2: 3.3 % (ref 2–3.5)
HEMOGLOBIN A: 96.3 % (ref 95.8–98)
HEMOGLOBIN F: 0.4 % (ref 0–2)
HEMOGLOBIN IDENTIFICATION INTERPRETATION: NORMAL
N GONORRHOEA DNA SPEC QL PROBE+SIG AMP: NEGATIVE
PATH REVIEW-HGB IDENTIFICATION: NORMAL

## 2024-11-19 LAB
COMMENTS - MP RESULT TYPE: NORMAL
SCAN RESULT: NORMAL

## 2024-11-26 ENCOUNTER — HOSPITAL ENCOUNTER (OUTPATIENT)
Dept: RADIOLOGY | Facility: CLINIC | Age: 34
Discharge: HOME | End: 2024-11-26
Payer: COMMERCIAL

## 2024-11-26 DIAGNOSIS — Z34.90 ENCOUNTER FOR SUPERVISION OF NORMAL PREGNANCY, UNSPECIFIED, UNSPECIFIED TRIMESTER: ICD-10-CM

## 2024-11-26 DIAGNOSIS — Z3A.10 10 WEEKS GESTATION OF PREGNANCY (HHS-HCC): ICD-10-CM

## 2024-11-26 PROCEDURE — 76813 OB US NUCHAL MEAS 1 GEST: CPT | Performed by: STUDENT IN AN ORGANIZED HEALTH CARE EDUCATION/TRAINING PROGRAM

## 2024-11-26 PROCEDURE — 76813 OB US NUCHAL MEAS 1 GEST: CPT

## 2024-12-09 ENCOUNTER — ROUTINE PRENATAL (OUTPATIENT)
Dept: OBSTETRICS AND GYNECOLOGY | Facility: HOSPITAL | Age: 34
End: 2024-12-09
Payer: COMMERCIAL

## 2024-12-09 VITALS — DIASTOLIC BLOOD PRESSURE: 72 MMHG | WEIGHT: 126 LBS | BODY MASS INDEX: 22.32 KG/M2 | SYSTOLIC BLOOD PRESSURE: 109 MMHG

## 2024-12-09 DIAGNOSIS — O26.892 VAGINAL DISCHARGE DURING PREGNANCY IN SECOND TRIMESTER (HHS-HCC): Primary | ICD-10-CM

## 2024-12-09 DIAGNOSIS — Z3A.14 14 WEEKS GESTATION OF PREGNANCY (HHS-HCC): ICD-10-CM

## 2024-12-09 DIAGNOSIS — N89.8 VAGINAL DISCHARGE DURING PREGNANCY IN SECOND TRIMESTER (HHS-HCC): Primary | ICD-10-CM

## 2024-12-09 PROCEDURE — 0501F PRENATAL FLOW SHEET: CPT | Performed by: STUDENT IN AN ORGANIZED HEALTH CARE EDUCATION/TRAINING PROGRAM

## 2024-12-09 RX ORDER — PHENAZOPYRIDINE HYDROCHLORIDE 200 MG/1
100 TABLET, FILM COATED ORAL ONCE AS NEEDED
Status: SHIPPED | OUTPATIENT
Start: 2024-12-09

## 2024-12-09 ASSESSMENT — ENCOUNTER SYMPTOMS
DEPRESSION: 0
OCCASIONAL FEELINGS OF UNSTEADINESS: 0
LOSS OF SENSATION IN FEET: 0

## 2024-12-09 NOTE — PROGRESS NOTES
Ob Visit  24     SUBJECTIVE      HPI: Kassy King is a 34 y.o.  at 14w5d here for RPNV.      Has been having increased watery vaginal discharge. Non-malodorous and no vaginal irritation. Not sure if it is urine, had been incontinent after last pregnancy and did PFPT with improvement.     Took oral boards since last visit, yay!     OBJECTIVE  Visit Vitals  /72   Wt 57.2 kg (126 lb)   LMP 2024   BMI 22.32 kg/m²   OB Status Pregnant   Smoking Status Never   BSA 1.59 m²      ASSESSMENT & PLAN    Kassy King is a 34 y.o.  at 14w5d here for the following concerns we addressed today:    Problem List Items Addressed This Visit       14 weeks gestation of pregnancy ([facility])    Overview     Desired provider in labor: [] CNM  [x] Physician  [x] Blood Products: [x] Yes, accepts [] No, needs counseling  [x] Initial BMI: Could not be calculated   [x] Prenatal Labs: reviewed  [x] Cervical Cancer Screening up to date  [x] Rh status: +  [x] Genetic Screening: [facility] reviewed  [x] NT US: (11-13 wks) wnl  [x] Baby ASA (if indicated): taking, had pp gHTN in P2  [x] Pregnancy dated by: LMP cw 7wk US    [] Anatomy US: (19-20 wks)  [] Federal Sterilization consent signed (if indicated):  [] 1hr GCT at 24-28wks:  [] Rhogam (if indicated):   [] Fetal Surveillance (if indicated):  [] Tdap (27-32 wks, may be given up to 36 wks if initial window missed):   [] RSV (32-36 wks) (Sept. to end of ):   [] Flu Vaccine:    [] Breastfeeding:  [] Postpartum Birth control method:   [] GBS at 36 - 37 wks:  [] 39 weeks discussion of IOL vs. Expectant management:  [x] Mode of delivery ( anticipated ): vaginal         Vaginal discharge during pregnancy in second trimester ([facility]) - Primary    Relevant Medications    phenazopyridine (Pyridium) tablet 100 mg       RTC in 4 weeks. Will trial pyridium to see if urinary leakage.     Enedina Kay MD

## 2024-12-11 ENCOUNTER — HOSPITAL ENCOUNTER (OUTPATIENT)
Dept: CARDIOLOGY | Facility: HOSPITAL | Age: 34
Discharge: HOME | End: 2024-12-11
Payer: COMMERCIAL

## 2024-12-11 DIAGNOSIS — Z3A.10 10 WEEKS GESTATION OF PREGNANCY (HHS-HCC): ICD-10-CM

## 2024-12-11 PROCEDURE — 93005 ELECTROCARDIOGRAM TRACING: CPT

## 2024-12-12 LAB
ATRIAL RATE: 59 BPM
P AXIS: 67 DEGREES
P OFFSET: 194 MS
P ONSET: 141 MS
PR INTERVAL: 156 MS
Q ONSET: 219 MS
QRS COUNT: 10 BEATS
QRS DURATION: 84 MS
QT INTERVAL: 402 MS
QTC CALCULATION(BAZETT): 397 MS
QTC FREDERICIA: 399 MS
R AXIS: 68 DEGREES
T AXIS: 36 DEGREES
T OFFSET: 420 MS
VENTRICULAR RATE: 59 BPM

## 2025-01-06 ENCOUNTER — ROUTINE PRENATAL (OUTPATIENT)
Dept: OBSTETRICS AND GYNECOLOGY | Facility: HOSPITAL | Age: 35
End: 2025-01-06
Payer: COMMERCIAL

## 2025-01-06 VITALS — BODY MASS INDEX: 22.85 KG/M2 | DIASTOLIC BLOOD PRESSURE: 68 MMHG | SYSTOLIC BLOOD PRESSURE: 104 MMHG | WEIGHT: 129 LBS

## 2025-01-06 DIAGNOSIS — Z86.79 HISTORY OF WOLFF-PARKINSON-WHITE (WPW) SYNDROME: Primary | ICD-10-CM

## 2025-01-06 DIAGNOSIS — Z3A.18 18 WEEKS GESTATION OF PREGNANCY (HHS-HCC): ICD-10-CM

## 2025-01-06 PROCEDURE — 0501F PRENATAL FLOW SHEET: CPT | Performed by: STUDENT IN AN ORGANIZED HEALTH CARE EDUCATION/TRAINING PROGRAM

## 2025-01-06 NOTE — PROGRESS NOTES
Ob Visit  25     SUBJECTIVE      HPI: Kassy King is a 34 y.o.  at 18w5d here for RPNV.  She has no contractions, no bleeding, or no LOF. Reports not yet feeling fetal movement. Patient denies cardiac symptoms. Did think her HR was a little lower than normal for her EKG but says it has been stably normal otherwise.      OBJECTIVE  Visit Vitals  /68   Wt 58.5 kg (129 lb)   LMP 2024   BMI 22.85 kg/m²   OB Status Pregnant   Smoking Status Never   BSA 1.61 m²      ASSESSMENT & PLAN    Kassy King is a 34 y.o.  at 18w5d here for the following concerns we addressed today:    Problem List Items Addressed This Visit       History of Omari-Parkinson-White (WPW) syndrome - Primary    Overview     s/p ablation in . Baseline EKG wnl. S/p TTE during prior pregnancy, wnl EF 60-65%, moderately dilated riaght atrium.  Baseline EKG in 2024 showed sinus monroe HR 59, however otherwise nl.          18 weeks gestation of pregnancy ([facility])    Overview     Desired provider in labor: [] CNM  [x] Physician  [x] Blood Products: [x] Yes, accepts [] No, needs counseling  [x] Initial BMI: Could not be calculated   [x] Prenatal Labs: reviewed  [x] Cervical Cancer Screening up to date  [x] Rh status: +  [x] Genetic Screening: [facility] reviewed  [x] NT US: (11-13 wks) wnl  [x] Baby ASA (if indicated): taking, had pp gHTN in P2  [x] Pregnancy dated by: LMP cw 7wk US    [] Anatomy US: (19-20 wks), scheduled  [] Federal Sterilization consent signed (if indicated):  [] 1hr GCT at 24-28wks:  [] Rhogam (if indicated):   [] Fetal Surveillance (if indicated):  [] Tdap (27-32 wks, may be given up to 36 wks if initial window missed):   [] RSV (32-36 wks) (Sept. to end of ):   [] Flu Vaccine: pt reports did get this year    [] Breastfeeding:  [] Postpartum Birth control method:   [] GBS at 36 - 37 wks:  [] 39 weeks discussion of IOL vs. Expectant management:  [x] Mode of delivery ( anticipated ): vaginal             RTC in 4 weeks.     Enedina Kay MD

## 2025-01-09 ENCOUNTER — HOSPITAL ENCOUNTER (OUTPATIENT)
Dept: RADIOLOGY | Facility: CLINIC | Age: 35
Discharge: HOME | End: 2025-01-09
Payer: COMMERCIAL

## 2025-01-09 DIAGNOSIS — Z03.73 FETAL ANOMALY SUSPECTED BUT NOT FOUND: ICD-10-CM

## 2025-01-09 DIAGNOSIS — Z3A.10 10 WEEKS GESTATION OF PREGNANCY (HHS-HCC): ICD-10-CM

## 2025-01-09 PROCEDURE — 76805 OB US >/= 14 WKS SNGL FETUS: CPT | Performed by: STUDENT IN AN ORGANIZED HEALTH CARE EDUCATION/TRAINING PROGRAM

## 2025-01-09 PROCEDURE — 76805 OB US >/= 14 WKS SNGL FETUS: CPT

## 2025-01-13 ENCOUNTER — APPOINTMENT (OUTPATIENT)
Dept: DERMATOLOGY | Facility: CLINIC | Age: 35
End: 2025-01-13
Payer: COMMERCIAL

## 2025-02-03 ENCOUNTER — ROUTINE PRENATAL (OUTPATIENT)
Dept: OBSTETRICS AND GYNECOLOGY | Facility: HOSPITAL | Age: 35
End: 2025-02-03
Payer: COMMERCIAL

## 2025-02-03 VITALS — WEIGHT: 130 LBS | SYSTOLIC BLOOD PRESSURE: 111 MMHG | DIASTOLIC BLOOD PRESSURE: 74 MMHG | BODY MASS INDEX: 23.03 KG/M2

## 2025-02-03 DIAGNOSIS — Z3A.22 22 WEEKS GESTATION OF PREGNANCY (HHS-HCC): Primary | ICD-10-CM

## 2025-02-03 PROBLEM — O26.892 VAGINAL DISCHARGE DURING PREGNANCY IN SECOND TRIMESTER (HHS-HCC): Status: RESOLVED | Noted: 2024-12-09 | Resolved: 2025-02-03

## 2025-02-03 PROBLEM — N89.8 VAGINAL DISCHARGE DURING PREGNANCY IN SECOND TRIMESTER (HHS-HCC): Status: RESOLVED | Noted: 2024-12-09 | Resolved: 2025-02-03

## 2025-02-03 PROCEDURE — 0501F PRENATAL FLOW SHEET: CPT | Performed by: STUDENT IN AN ORGANIZED HEALTH CARE EDUCATION/TRAINING PROGRAM

## 2025-02-03 NOTE — PROGRESS NOTES
Ob Visit  25     SUBJECTIVE      HPI: Kassy King is a 34 y.o.  at 22w5d here for RPNV.  She has no contractions, no bleeding, or LOF. Reports normal fetal movement. Patient reports feeling bigger this time than last, feels normal. No acute concerns today.  Going on a international family trip in .      OBJECTIVE  Visit Vitals  /74   Wt 59 kg (130 lb)   LMP 2024   BMI 23.03 kg/m²   OB Status Pregnant   Smoking Status Never   BSA 1.62 m²        ASSESSMENT & PLAN    Kassy King is a 34 y.o.  at 22w5d here for the following concerns we addressed today:    Problem List Items Addressed This Visit       22 weeks gestation of pregnancy ([facility]) - Primary    Overview     Desired provider in labor: [] CNM  [x] Physician  [x] Blood Products: [x] Yes, accepts [] No, needs counseling  [x] Initial BMI: Could not be calculated   [x] Prenatal Labs: reviewed  [x] Cervical Cancer Screening up to date  [x] Rh status: +  [x] Genetic Screening: [facility] reviewed  [x] NT US: (11-13 wks) wnl  [x] Baby ASA (if indicated): taking, had pp gHTN in P2  [x] Pregnancy dated by: LMP cw 7wk US    [x] Anatomy US: (19-20 wks) reviewed  [] Federal Sterilization consent signed (if indicated):  [] 1hr GCT at 24-28wks:  [] Rhogam (if indicated):   [] Fetal Surveillance (if indicated):  [] Tdap (27-32 wks, may be given up to 36 wks if initial window missed):   [] RSV (32-36 wks) (Sept. to end of ):   [x] Flu Vaccine: pt reports did get this year    [] Breastfeeding:  [] Postpartum Birth control method:   [] GBS at 36 - 37 wks:  [] 39 weeks discussion of IOL vs. Expectant management:  [x] Mode of delivery ( anticipated ): vaginal            RTC in 4 weeks.     Enedina Kay MD

## 2025-03-03 ENCOUNTER — APPOINTMENT (OUTPATIENT)
Dept: OBSTETRICS AND GYNECOLOGY | Facility: HOSPITAL | Age: 35
End: 2025-03-03
Payer: COMMERCIAL

## 2025-03-24 ENCOUNTER — ROUTINE PRENATAL (OUTPATIENT)
Dept: OBSTETRICS AND GYNECOLOGY | Facility: HOSPITAL | Age: 35
End: 2025-03-24
Payer: COMMERCIAL

## 2025-03-24 ENCOUNTER — APPOINTMENT (OUTPATIENT)
Dept: LAB | Facility: HOSPITAL | Age: 35
End: 2025-03-24
Payer: COMMERCIAL

## 2025-03-24 VITALS — WEIGHT: 140 LBS | DIASTOLIC BLOOD PRESSURE: 68 MMHG | BODY MASS INDEX: 24.8 KG/M2 | SYSTOLIC BLOOD PRESSURE: 102 MMHG

## 2025-03-24 DIAGNOSIS — R10.2 PELVIC PAIN AFFECTING PREGNANCY IN THIRD TRIMESTER, ANTEPARTUM (HHS-HCC): ICD-10-CM

## 2025-03-24 DIAGNOSIS — O99.013 ANEMIA AFFECTING PREGNANCY IN THIRD TRIMESTER: Primary | ICD-10-CM

## 2025-03-24 DIAGNOSIS — Z3A.29 29 WEEKS GESTATION OF PREGNANCY (HHS-HCC): Primary | ICD-10-CM

## 2025-03-24 DIAGNOSIS — O26.893 PELVIC PAIN AFFECTING PREGNANCY IN THIRD TRIMESTER, ANTEPARTUM (HHS-HCC): ICD-10-CM

## 2025-03-24 DIAGNOSIS — O26.843 FUNDAL HEIGHT LOW FOR DATES IN THIRD TRIMESTER (HHS-HCC): ICD-10-CM

## 2025-03-24 LAB
ABO GROUP (TYPE) IN BLOOD: NORMAL
ANTIBODY SCREEN: NORMAL
ERYTHROCYTE [DISTWIDTH] IN BLOOD BY AUTOMATED COUNT: 11.9 % (ref 11.5–14.5)
FERRITIN SERPL-MCNC: <8 NG/ML
FOLATE SERPL-MCNC: 19.3 NG/ML
HCT VFR BLD AUTO: 34.1 % (ref 36–46)
HGB BLD-MCNC: 10.7 G/DL (ref 12–16)
IRON SATN MFR SERPL: NORMAL %
IRON SERPL-MCNC: 39 UG/DL
MCH RBC QN AUTO: 27.3 PG (ref 26–34)
MCHC RBC AUTO-ENTMCNC: 31.4 G/DL (ref 32–36)
MCV RBC AUTO: 87 FL (ref 80–100)
NRBC BLD-RTO: 0 /100 WBCS (ref 0–0)
PLATELET # BLD AUTO: 315 X10*3/UL (ref 150–450)
RBC # BLD AUTO: 3.92 X10*6/UL (ref 4–5.2)
REFLEX ADDED, ANEMIA PANEL: NORMAL
RH FACTOR (ANTIGEN D): NORMAL
TIBC SERPL-MCNC: NORMAL UG/DL
UIBC SERPL-MCNC: >450 UG/DL
VIT B12 SERPL-MCNC: 231 PG/ML
WBC # BLD AUTO: 9.2 X10*3/UL (ref 4.4–11.3)

## 2025-03-24 PROCEDURE — 0501F PRENATAL FLOW SHEET: CPT | Performed by: STUDENT IN AN ORGANIZED HEALTH CARE EDUCATION/TRAINING PROGRAM

## 2025-03-24 PROCEDURE — 83550 IRON BINDING TEST: CPT

## 2025-03-24 PROCEDURE — 82607 VITAMIN B-12: CPT

## 2025-03-24 PROCEDURE — 85027 COMPLETE CBC AUTOMATED: CPT

## 2025-03-24 PROCEDURE — 86850 RBC ANTIBODY SCREEN: CPT

## 2025-03-24 PROCEDURE — 90471 IMMUNIZATION ADMIN: CPT | Performed by: STUDENT IN AN ORGANIZED HEALTH CARE EDUCATION/TRAINING PROGRAM

## 2025-03-24 PROCEDURE — 82728 ASSAY OF FERRITIN: CPT

## 2025-03-24 PROCEDURE — 86901 BLOOD TYPING SEROLOGIC RH(D): CPT

## 2025-03-24 PROCEDURE — 82746 ASSAY OF FOLIC ACID SERUM: CPT

## 2025-03-24 PROCEDURE — 86900 BLOOD TYPING SEROLOGIC ABO: CPT

## 2025-03-24 PROCEDURE — 36415 COLL VENOUS BLD VENIPUNCTURE: CPT

## 2025-03-24 RX ORDER — FERROUS SULFATE 325(65) MG
325 TABLET ORAL
Qty: 30 TABLET | Refills: 11 | Status: SHIPPED | OUTPATIENT
Start: 2025-03-24 | End: 2026-03-24

## 2025-03-24 NOTE — PROGRESS NOTES
Ob Visit  25     SUBJECTIVE      HPI: Kassy King is a 34 y.o.  at 29w5d here for RPNV.  She has no contractions, bleeding, or LOF. Reports normal fetal movement.     Gender reveal is Friday!    OBJECTIVE  Visit Vitals  /68   Wt 63.5 kg (140 lb)   LMP 2024   BMI 24.80 kg/m²   OB Status Pregnant   Smoking Status Never   BSA 1.68 m²        ASSESSMENT & PLAN    Kassy King is a 34 y.o.  at 29w5d here for the following concerns we addressed today:    Problem List Items Addressed This Visit       29 weeks gestation of pregnancy ([facility]) - Primary    Overview     Desired provider in labor: [] CNM  [x] Physician  [x] Blood Products: [x] Yes, accepts [] No, needs counseling  [x] Initial BMI: Could not be calculated   [x] Prenatal Labs: reviewed  [x] Cervical Cancer Screening up to date  [x] Rh status: +  [x] Genetic Screening: [facility] reviewed  [x] NT US: (11-13 wks) wnl  [x] Baby ASA (if indicated): taking, had pp gHTN in P2  [x] Pregnancy dated by: LMP cw 7wk US    [x] Anatomy US: (19-20 wks) reviewed  [] Federal Sterilization consent signed (if indicated):  [] 1hr GCT at 24-28wks: ordered 3/24  [] Rhogam (if indicated):   [] Fetal Surveillance (if indicated):  [x] Tdap (27-32 wks, may be given up to 36 wks if initial window missed): 3/24  [x] Flu Vaccine: pt reports did get this year    [] Breastfeeding:  [] Postpartum Birth control method:   [] GBS at 36 - 37 wks:  [x] 39 weeks discussion of IOL vs. Expectant management: went into labor at 38wks previously, but would want rrIOL at 39wks if does not this time  [x] Mode of delivery ( anticipated ): vaginal         Relevant Orders    CBC Anemia Panel With Reflex,Pregnancy    Glucose, 1 Hour Screen, Pregnancy    Syphilis Screen with Reflex    Type And Screen Is this order related to pregnancy or an upcoming surgery? Yes; Where will this surgery/delivery be performed? [facility] What is the date of the surgery? 2025;  Has this patient ever had a transfusion? No; Has th...    US MAC OB imaging order    Fundal height low for dates in third trimester ([facility])    Overview     29wks measuring 24wks fundal height. Had IUGR baby in P1, however likely constitutionally small given maternal & paternal size.          Relevant Orders    US MAC OB imaging order    Pelvic pain affecting pregnancy in third trimester, antepartum ([facility])    Overview     Similar to previous pregnancy, open to PT.          Relevant Orders    Referral to Physical Therapy       RTC in 2 weeks    Enedina Kay MD

## 2025-03-27 LAB
GLUCOSE 1H P 50 G GLC PO SERPL-MCNC: 106 MG/DL
T PALLIDUM AB SER QL IA: NEGATIVE

## 2025-03-31 DIAGNOSIS — O99.713 PRURITUS OF PREGNANCY IN THIRD TRIMESTER (HHS-HCC): Primary | ICD-10-CM

## 2025-03-31 DIAGNOSIS — L29.9 PRURITUS OF PREGNANCY IN THIRD TRIMESTER (HHS-HCC): Primary | ICD-10-CM

## 2025-04-06 LAB
ALBUMIN SERPL-MCNC: 3.7 G/DL (ref 3.6–5.1)
ALP SERPL-CCNC: 74 U/L (ref 31–125)
ALT SERPL-CCNC: 10 U/L (ref 6–29)
ANION GAP SERPL CALCULATED.4IONS-SCNC: 8 MMOL/L (CALC) (ref 7–17)
AST SERPL-CCNC: 15 U/L (ref 10–30)
BILE AC SERPL-SCNC: 8 UMOL/L (ref 0–10)
BILIRUB SERPL-MCNC: 0.3 MG/DL (ref 0.2–1.2)
BUN SERPL-MCNC: 8 MG/DL (ref 7–25)
CALCIUM SERPL-MCNC: 9.1 MG/DL (ref 8.6–10.2)
CHLORIDE SERPL-SCNC: 103 MMOL/L (ref 98–110)
CO2 SERPL-SCNC: 26 MMOL/L (ref 20–32)
CREAT SERPL-MCNC: 0.45 MG/DL (ref 0.5–0.97)
EGFRCR SERPLBLD CKD-EPI 2021: 130 ML/MIN/1.73M2
GLUCOSE SERPL-MCNC: 72 MG/DL (ref 65–99)
POTASSIUM SERPL-SCNC: 4.2 MMOL/L (ref 3.5–5.3)
PROT SERPL-MCNC: 6.8 G/DL (ref 6.1–8.1)
SODIUM SERPL-SCNC: 137 MMOL/L (ref 135–146)

## 2025-04-07 ENCOUNTER — APPOINTMENT (OUTPATIENT)
Dept: OBSTETRICS AND GYNECOLOGY | Facility: HOSPITAL | Age: 35
End: 2025-04-07
Payer: COMMERCIAL

## 2025-04-11 ENCOUNTER — HOSPITAL ENCOUNTER (OUTPATIENT)
Dept: RADIOLOGY | Facility: HOSPITAL | Age: 35
Discharge: HOME | End: 2025-04-11
Payer: COMMERCIAL

## 2025-04-11 DIAGNOSIS — Z3A.29 29 WEEKS GESTATION OF PREGNANCY (HHS-HCC): ICD-10-CM

## 2025-04-11 DIAGNOSIS — O26.843 FUNDAL HEIGHT LOW FOR DATES IN THIRD TRIMESTER (HHS-HCC): ICD-10-CM

## 2025-04-11 PROBLEM — O40.3XX0 POLYHYDRAMNIOS IN THIRD TRIMESTER (HHS-HCC): Status: ACTIVE | Noted: 2025-04-11

## 2025-04-11 PROCEDURE — 76819 FETAL BIOPHYS PROFIL W/O NST: CPT

## 2025-04-11 PROCEDURE — 76816 OB US FOLLOW-UP PER FETUS: CPT

## 2025-04-18 ENCOUNTER — HOSPITAL ENCOUNTER (OUTPATIENT)
Dept: RADIOLOGY | Facility: HOSPITAL | Age: 35
Discharge: HOME | End: 2025-04-18
Payer: COMMERCIAL

## 2025-04-18 DIAGNOSIS — Z3A.29 29 WEEKS GESTATION OF PREGNANCY (HHS-HCC): ICD-10-CM

## 2025-04-18 DIAGNOSIS — O26.843 FUNDAL HEIGHT LOW FOR DATES IN THIRD TRIMESTER (HHS-HCC): ICD-10-CM

## 2025-04-18 PROBLEM — O35.8XX0 UMBILICAL VEIN ABNORMALITY AFFECTING PREGNANCY: Status: ACTIVE | Noted: 2025-04-18

## 2025-04-18 PROCEDURE — 76999 ECHO EXAMINATION PROCEDURE: CPT

## 2025-04-18 PROCEDURE — 76815 OB US LIMITED FETUS(S): CPT

## 2025-04-23 ENCOUNTER — HOSPITAL ENCOUNTER (OUTPATIENT)
Dept: RADIOLOGY | Facility: HOSPITAL | Age: 35
Discharge: HOME | End: 2025-04-23
Payer: COMMERCIAL

## 2025-04-23 DIAGNOSIS — Z3A.29 29 WEEKS GESTATION OF PREGNANCY (HHS-HCC): ICD-10-CM

## 2025-04-23 DIAGNOSIS — O26.843 FUNDAL HEIGHT LOW FOR DATES IN THIRD TRIMESTER (HHS-HCC): ICD-10-CM

## 2025-04-23 PROCEDURE — 76819 FETAL BIOPHYS PROFIL W/O NST: CPT

## 2025-04-23 PROCEDURE — 76815 OB US LIMITED FETUS(S): CPT

## 2025-05-01 ENCOUNTER — HOSPITAL ENCOUNTER (OUTPATIENT)
Dept: RADIOLOGY | Facility: HOSPITAL | Age: 35
Discharge: HOME | End: 2025-05-01
Payer: COMMERCIAL

## 2025-05-01 DIAGNOSIS — Z3A.29 29 WEEKS GESTATION OF PREGNANCY (HHS-HCC): ICD-10-CM

## 2025-05-01 DIAGNOSIS — O26.843 FUNDAL HEIGHT LOW FOR DATES IN THIRD TRIMESTER (HHS-HCC): ICD-10-CM

## 2025-05-01 PROCEDURE — 76999 ECHO EXAMINATION PROCEDURE: CPT

## 2025-05-01 PROCEDURE — 76816 OB US FOLLOW-UP PER FETUS: CPT

## 2025-05-05 ENCOUNTER — ROUTINE PRENATAL (OUTPATIENT)
Dept: OBSTETRICS AND GYNECOLOGY | Facility: HOSPITAL | Age: 35
End: 2025-05-05
Payer: COMMERCIAL

## 2025-05-05 VITALS — BODY MASS INDEX: 26.57 KG/M2 | WEIGHT: 150 LBS | SYSTOLIC BLOOD PRESSURE: 119 MMHG | DIASTOLIC BLOOD PRESSURE: 77 MMHG

## 2025-05-05 DIAGNOSIS — Z3A.35 35 WEEKS GESTATION OF PREGNANCY (HHS-HCC): Primary | ICD-10-CM

## 2025-05-05 PROCEDURE — 0501F PRENATAL FLOW SHEET: CPT | Performed by: STUDENT IN AN ORGANIZED HEALTH CARE EDUCATION/TRAINING PROGRAM

## 2025-05-05 NOTE — PROGRESS NOTES
Ob Visit  25     SUBJECTIVE      HPI: Kassy King is a 34 y.o.  at 35w5d here for RPNV.  She has many contractions, no bleeding or LOF. Reports normal fetal movement.        OBJECTIVE  Visit Vitals  /77   Wt 68 kg (150 lb)   LMP 2024   BMI 26.57 kg/m²   OB Status Pregnant   Smoking Status Never   BSA 1.74 m²        ASSESSMENT & PLAN    Kassy King is a 34 y.o.  at 35w5d here for the following concerns we addressed today:    Problem List Items Addressed This Visit       35 weeks gestation of pregnancy ([facility]) - Primary    Overview   Desired provider in labor: [] CNM  [x] Physician  [x] Blood Products: [x] Yes, accepts [] No, needs counseling  [x] Initial BMI: Could not be calculated   [x] Prenatal Labs: reviewed  [x] Cervical Cancer Screening up to date  [x] Rh status: +  [x] Genetic Screening: [facility] reviewed  [x] NT US: (11-13 wks) wnl  [x] Baby ASA (if indicated): taking, had pp gHTN in P2  [x] Pregnancy dated by: LMP cw 7wk US    [x] Anatomy US: (19-20 wks) reviewed  [] Federal Sterilization consent signed (if indicated):  [x] 1hr GCT at 24-28wks: 106  [x] Fetal Surveillance (if indicated): weekly BPPs for umbilical vein varix  [x] Tdap (27-32 wks, may be given up to 36 wks if initial window missed): 3/24  [x] Flu Vaccine: pt reports did get this year    [] Breastfeeding:  [] Postpartum Birth control method:   [x] GBS at 36 - 37 wks: collected   [x] 39 weeks discussion of IOL vs. Expectant management: went into labor at 38wks previously, but would want rrIOL at 39wks if does not this time  [x] Mode of delivery ( anticipated ): vaginal         Relevant Orders    Group B Streptococcus (GBS) Prenatal Screen, Culture       RTC in 2 weeks.     Enedina Kay MD

## 2025-05-08 LAB — GP B STREP SPEC QL CULT: NORMAL

## 2025-05-09 ENCOUNTER — APPOINTMENT (OUTPATIENT)
Dept: RADIOLOGY | Facility: HOSPITAL | Age: 35
End: 2025-05-09
Payer: COMMERCIAL

## 2025-05-09 ENCOUNTER — HOSPITAL ENCOUNTER (OUTPATIENT)
Dept: RADIOLOGY | Facility: HOSPITAL | Age: 35
Discharge: HOME | End: 2025-05-09
Payer: COMMERCIAL

## 2025-05-09 DIAGNOSIS — Z3A.29 29 WEEKS GESTATION OF PREGNANCY (HHS-HCC): ICD-10-CM

## 2025-05-09 DIAGNOSIS — O26.843 FUNDAL HEIGHT LOW FOR DATES IN THIRD TRIMESTER (HHS-HCC): ICD-10-CM

## 2025-05-09 PROCEDURE — 76819 FETAL BIOPHYS PROFIL W/O NST: CPT

## 2025-05-09 PROCEDURE — 76815 OB US LIMITED FETUS(S): CPT

## 2025-05-15 ENCOUNTER — HOSPITAL ENCOUNTER (OUTPATIENT)
Dept: RADIOLOGY | Facility: HOSPITAL | Age: 35
Discharge: HOME | End: 2025-05-15
Payer: COMMERCIAL

## 2025-05-15 DIAGNOSIS — O26.843 FUNDAL HEIGHT LOW FOR DATES IN THIRD TRIMESTER (HHS-HCC): ICD-10-CM

## 2025-05-15 DIAGNOSIS — Z3A.29 29 WEEKS GESTATION OF PREGNANCY (HHS-HCC): ICD-10-CM

## 2025-05-15 PROCEDURE — 76815 OB US LIMITED FETUS(S): CPT

## 2025-05-15 PROCEDURE — 76819 FETAL BIOPHYS PROFIL W/O NST: CPT

## 2025-05-19 ENCOUNTER — ROUTINE PRENATAL (OUTPATIENT)
Dept: OBSTETRICS AND GYNECOLOGY | Facility: HOSPITAL | Age: 35
End: 2025-05-19
Payer: COMMERCIAL

## 2025-05-19 VITALS
HEART RATE: 86 BPM | SYSTOLIC BLOOD PRESSURE: 150 MMHG | BODY MASS INDEX: 27.35 KG/M2 | WEIGHT: 154.4 LBS | DIASTOLIC BLOOD PRESSURE: 88 MMHG

## 2025-05-19 DIAGNOSIS — O16.3 ELEVATED BLOOD PRESSURE AFFECTING PREGNANCY IN THIRD TRIMESTER, ANTEPARTUM: Primary | ICD-10-CM

## 2025-05-19 DIAGNOSIS — Z3A.37 37 WEEKS GESTATION OF PREGNANCY (HHS-HCC): ICD-10-CM

## 2025-05-19 PROCEDURE — 0501F PRENATAL FLOW SHEET: CPT | Performed by: STUDENT IN AN ORGANIZED HEALTH CARE EDUCATION/TRAINING PROGRAM

## 2025-05-19 RX ORDER — ACETAMINOPHEN 500 MG
1 TABLET ORAL DAILY
Qty: 1 KIT | Refills: 0 | Status: SHIPPED | OUTPATIENT
Start: 2025-05-19

## 2025-05-19 ASSESSMENT — PAIN SCALES - GENERAL: PAINLEVEL_OUTOF10: 0-NO PAIN

## 2025-05-19 NOTE — PROGRESS NOTES
Ob Visit  25     SUBJECTIVE      HPI: Kassy King is a 34 y.o.  at 37w5d here for RPNV.  She has contractions, no bleeding or LOF. Reports normal fetal movement.      OBJECTIVE  Visit Vitals  /88 (BP Location: Left arm, Patient Position: Sitting, BP Cuff Size: Adult)   Pulse 86   Wt 70 kg (154 lb 6.4 oz)   LMP 2024   BMI 27.35 kg/m²   OB Status Pregnant   Smoking Status Never   BSA 1.76 m²        ASSESSMENT & PLAN    Kassy King is a 34 y.o.  at 37w5d here for the following concerns we addressed today:    Problem List Items Addressed This Visit       37 weeks gestation of pregnancy ([facility])    Overview   Desired provider in labor: [] CNM  [x] Physician  [x] Blood Products: [x] Yes, accepts [] No, needs counseling  [x] Initial BMI: Could not be calculated   [x] Prenatal Labs: reviewed  [x] Cervical Cancer Screening up to date  [x] Rh status: +  [x] Genetic Screening: [facility] reviewed  [x] NT US: (11-13 wks) wnl  [x] Baby ASA (if indicated): taking, had pp gHTN in P2  [x] Pregnancy dated by: LMP cw 7wk US    [x] Anatomy US: (19-20 wks) reviewed  [] Federal Sterilization consent signed (if indicated):  [x] 1hr GCT at 24-28wks: 106  [x] Fetal Surveillance (if indicated): weekly BPPs for umbilical vein varix  [x] Tdap (27-32 wks, may be given up to 36 wks if initial window missed): 3/24  [x] Flu Vaccine: pt reports did get this year    [] Breastfeeding:  [] Postpartum Birth control method:   [x] GBS at 36 - 37 wks: neg  [x] 39 weeks discussion of IOL vs. Expectant management: will do IOL for elevated BP, timing pending scheduling  [x] Mode of delivery ( anticipated ): vaginal         Elevated blood pressure affecting pregnancy in third trimester, antepartum - Primary    Overview   /88 at 37.5wga office visit.          Relevant Medications    blood pressure monitor (Blood Pressure Kit) kit         Patient to contact me tomorrow with BP reading. 2.5cm dilated, will plan for  pitcoin/AROM IOL around childcare scheduling. If pressures normal, IOL on 5/29.     Enedina Kay MD

## 2025-05-20 ENCOUNTER — ANESTHESIA (OUTPATIENT)
Dept: OBSTETRICS AND GYNECOLOGY | Facility: HOSPITAL | Age: 35
End: 2025-05-20
Payer: COMMERCIAL

## 2025-05-20 ENCOUNTER — HOSPITAL ENCOUNTER (INPATIENT)
Facility: HOSPITAL | Age: 35
LOS: 2 days | Discharge: HOME | End: 2025-05-22
Attending: SPECIALIST | Admitting: STUDENT IN AN ORGANIZED HEALTH CARE EDUCATION/TRAINING PROGRAM
Payer: COMMERCIAL

## 2025-05-20 ENCOUNTER — ANESTHESIA EVENT (OUTPATIENT)
Dept: OBSTETRICS AND GYNECOLOGY | Facility: HOSPITAL | Age: 35
End: 2025-05-20
Payer: COMMERCIAL

## 2025-05-20 PROBLEM — D64.9 ANEMIA: Status: ACTIVE | Noted: 2025-05-20

## 2025-05-20 PROBLEM — J06.9 RECENT URI: Status: ACTIVE | Noted: 2025-05-20

## 2025-05-20 PROBLEM — G62.9 PERIPHERAL NEUROPATHY: Status: ACTIVE | Noted: 2025-05-20

## 2025-05-20 LAB
ABO GROUP (TYPE) IN BLOOD: NORMAL
ALBUMIN SERPL BCP-MCNC: 3.4 G/DL (ref 3.4–5)
ALP SERPL-CCNC: 116 U/L (ref 33–110)
ALT SERPL W P-5'-P-CCNC: 10 U/L (ref 7–45)
ANION GAP SERPL CALC-SCNC: 13 MMOL/L (ref 10–20)
ANTIBODY SCREEN: NORMAL
AST SERPL W P-5'-P-CCNC: 12 U/L (ref 9–39)
BILIRUB SERPL-MCNC: 0.3 MG/DL (ref 0–1.2)
BUN SERPL-MCNC: 10 MG/DL (ref 6–23)
CALCIUM SERPL-MCNC: 8.9 MG/DL (ref 8.6–10.6)
CHLORIDE SERPL-SCNC: 102 MMOL/L (ref 98–107)
CO2 SERPL-SCNC: 25 MMOL/L (ref 21–32)
CREAT SERPL-MCNC: 0.53 MG/DL (ref 0.5–1.05)
CREAT UR-MCNC: 125.6 MG/DL (ref 20–320)
EGFRCR SERPLBLD CKD-EPI 2021: >90 ML/MIN/1.73M*2
ERYTHROCYTE [DISTWIDTH] IN BLOOD BY AUTOMATED COUNT: 13.2 % (ref 11.5–14.5)
GLUCOSE SERPL-MCNC: 85 MG/DL (ref 74–99)
HCT VFR BLD AUTO: 30.1 % (ref 36–46)
HGB BLD-MCNC: 9.2 G/DL (ref 12–16)
MCH RBC QN AUTO: 24.4 PG (ref 26–34)
MCHC RBC AUTO-ENTMCNC: 30.6 G/DL (ref 32–36)
MCV RBC AUTO: 80 FL (ref 80–100)
NRBC BLD-RTO: 0 /100 WBCS (ref 0–0)
PLATELET # BLD AUTO: 292 X10*3/UL (ref 150–450)
POTASSIUM SERPL-SCNC: 4 MMOL/L (ref 3.5–5.3)
PROT SERPL-MCNC: 6.5 G/DL (ref 6.4–8.2)
PROT UR-ACNC: 22 MG/DL (ref 5–24)
PROT/CREAT UR: 0.18 MG/MG CREAT (ref 0–0.17)
RBC # BLD AUTO: 3.77 X10*6/UL (ref 4–5.2)
RH FACTOR (ANTIGEN D): NORMAL
SODIUM SERPL-SCNC: 136 MMOL/L (ref 136–145)
TREPONEMA PALLIDUM IGG+IGM AB [PRESENCE] IN SERUM OR PLASMA BY IMMUNOASSAY: NONREACTIVE
WBC # BLD AUTO: 8.7 X10*3/UL (ref 4.4–11.3)

## 2025-05-20 PROCEDURE — 1120000001 HC OB PRIVATE ROOM DAILY

## 2025-05-20 PROCEDURE — 2500000004 HC RX 250 GENERAL PHARMACY W/ HCPCS (ALT 636 FOR OP/ED): Mod: JZ | Performed by: STUDENT IN AN ORGANIZED HEALTH CARE EDUCATION/TRAINING PROGRAM

## 2025-05-20 PROCEDURE — 7210000002 HC LABOR PER HOUR

## 2025-05-20 PROCEDURE — 86780 TREPONEMA PALLIDUM: CPT | Performed by: STUDENT IN AN ORGANIZED HEALTH CARE EDUCATION/TRAINING PROGRAM

## 2025-05-20 PROCEDURE — 2500000004 HC RX 250 GENERAL PHARMACY W/ HCPCS (ALT 636 FOR OP/ED): Mod: JZ

## 2025-05-20 PROCEDURE — 51701 INSERT BLADDER CATHETER: CPT

## 2025-05-20 PROCEDURE — 99213 OFFICE O/P EST LOW 20 MIN: CPT

## 2025-05-20 PROCEDURE — 86901 BLOOD TYPING SEROLOGIC RH(D): CPT | Performed by: STUDENT IN AN ORGANIZED HEALTH CARE EDUCATION/TRAINING PROGRAM

## 2025-05-20 PROCEDURE — 01967 NEURAXL LBR ANES VAG DLVR: CPT | Performed by: STUDENT IN AN ORGANIZED HEALTH CARE EDUCATION/TRAINING PROGRAM

## 2025-05-20 PROCEDURE — 3700000014 EPIDURAL BLOCK: Performed by: STUDENT IN AN ORGANIZED HEALTH CARE EDUCATION/TRAINING PROGRAM

## 2025-05-20 PROCEDURE — 86923 COMPATIBILITY TEST ELECTRIC: CPT

## 2025-05-20 PROCEDURE — 85027 COMPLETE CBC AUTOMATED: CPT | Performed by: STUDENT IN AN ORGANIZED HEALTH CARE EDUCATION/TRAINING PROGRAM

## 2025-05-20 PROCEDURE — 59050 FETAL MONITOR W/REPORT: CPT

## 2025-05-20 PROCEDURE — 82570 ASSAY OF URINE CREATININE: CPT | Performed by: STUDENT IN AN ORGANIZED HEALTH CARE EDUCATION/TRAINING PROGRAM

## 2025-05-20 PROCEDURE — 80053 COMPREHEN METABOLIC PANEL: CPT | Performed by: STUDENT IN AN ORGANIZED HEALTH CARE EDUCATION/TRAINING PROGRAM

## 2025-05-20 PROCEDURE — 3E033VJ INTRODUCTION OF OTHER HORMONE INTO PERIPHERAL VEIN, PERCUTANEOUS APPROACH: ICD-10-PCS | Performed by: STUDENT IN AN ORGANIZED HEALTH CARE EDUCATION/TRAINING PROGRAM

## 2025-05-20 PROCEDURE — 10907ZC DRAINAGE OF AMNIOTIC FLUID, THERAPEUTIC FROM PRODUCTS OF CONCEPTION, VIA NATURAL OR ARTIFICIAL OPENING: ICD-10-PCS | Performed by: STUDENT IN AN ORGANIZED HEALTH CARE EDUCATION/TRAINING PROGRAM

## 2025-05-20 PROCEDURE — 36415 COLL VENOUS BLD VENIPUNCTURE: CPT | Performed by: STUDENT IN AN ORGANIZED HEALTH CARE EDUCATION/TRAINING PROGRAM

## 2025-05-20 RX ORDER — CARBOPROST TROMETHAMINE 250 UG/ML
250 INJECTION, SOLUTION INTRAMUSCULAR ONCE AS NEEDED
Status: DISCONTINUED | OUTPATIENT
Start: 2025-05-20 | End: 2025-05-21

## 2025-05-20 RX ORDER — OXYTOCIN/0.9 % SODIUM CHLORIDE 30/500 ML
60 PLASTIC BAG, INJECTION (ML) INTRAVENOUS ONCE AS NEEDED
Status: COMPLETED | OUTPATIENT
Start: 2025-05-20 | End: 2025-05-21

## 2025-05-20 RX ORDER — BUPROPION HYDROCHLORIDE 150 MG/1
150 TABLET ORAL EVERY MORNING
Status: DISCONTINUED | OUTPATIENT
Start: 2025-05-21 | End: 2025-05-22 | Stop reason: HOSPADM

## 2025-05-20 RX ORDER — MISOPROSTOL 200 UG/1
800 TABLET ORAL ONCE AS NEEDED
Status: DISCONTINUED | OUTPATIENT
Start: 2025-05-20 | End: 2025-05-21

## 2025-05-20 RX ORDER — LOPERAMIDE HYDROCHLORIDE 2 MG/1
4 CAPSULE ORAL EVERY 2 HOUR PRN
Status: DISCONTINUED | OUTPATIENT
Start: 2025-05-20 | End: 2025-05-21

## 2025-05-20 RX ORDER — HYDRALAZINE HYDROCHLORIDE 20 MG/ML
5 INJECTION INTRAMUSCULAR; INTRAVENOUS ONCE AS NEEDED
Status: CANCELLED | OUTPATIENT
Start: 2025-05-20

## 2025-05-20 RX ORDER — TERBUTALINE SULFATE 1 MG/ML
0.25 INJECTION SUBCUTANEOUS ONCE AS NEEDED
Status: DISCONTINUED | OUTPATIENT
Start: 2025-05-20 | End: 2025-05-21

## 2025-05-20 RX ORDER — ONDANSETRON 4 MG/1
4 TABLET, FILM COATED ORAL EVERY 6 HOURS PRN
Status: CANCELLED | OUTPATIENT
Start: 2025-05-20

## 2025-05-20 RX ORDER — ONDANSETRON HYDROCHLORIDE 2 MG/ML
4 INJECTION, SOLUTION INTRAVENOUS EVERY 6 HOURS PRN
Status: CANCELLED | OUTPATIENT
Start: 2025-05-20

## 2025-05-20 RX ORDER — ONDANSETRON HYDROCHLORIDE 2 MG/ML
4 INJECTION, SOLUTION INTRAVENOUS EVERY 6 HOURS PRN
Status: DISCONTINUED | OUTPATIENT
Start: 2025-05-20 | End: 2025-05-21

## 2025-05-20 RX ORDER — LIDOCAINE HYDROCHLORIDE 10 MG/ML
30 INJECTION, SOLUTION INFILTRATION; PERINEURAL ONCE AS NEEDED
Status: DISCONTINUED | OUTPATIENT
Start: 2025-05-20 | End: 2025-05-21

## 2025-05-20 RX ORDER — OXYTOCIN 10 [USP'U]/ML
10 INJECTION, SOLUTION INTRAMUSCULAR; INTRAVENOUS ONCE AS NEEDED
Status: DISCONTINUED | OUTPATIENT
Start: 2025-05-20 | End: 2025-05-21

## 2025-05-20 RX ORDER — NALBUPHINE HYDROCHLORIDE 10 MG/ML
10 INJECTION INTRAMUSCULAR; INTRAVENOUS; SUBCUTANEOUS
Status: DISCONTINUED | OUTPATIENT
Start: 2025-05-20 | End: 2025-05-21

## 2025-05-20 RX ORDER — LABETALOL HYDROCHLORIDE 5 MG/ML
20 INJECTION, SOLUTION INTRAVENOUS ONCE AS NEEDED
Status: CANCELLED | OUTPATIENT
Start: 2025-05-20

## 2025-05-20 RX ORDER — FENTANYL/ROPIVACAINE/NS/PF 2MCG/ML-.2
0-25 PLASTIC BAG, INJECTION (ML) INJECTION CONTINUOUS
Refills: 0 | Status: DISCONTINUED | OUTPATIENT
Start: 2025-05-20 | End: 2025-05-21

## 2025-05-20 RX ORDER — NIFEDIPINE 10 MG/1
10 CAPSULE ORAL ONCE AS NEEDED
Status: CANCELLED | OUTPATIENT
Start: 2025-05-20

## 2025-05-20 RX ORDER — LIDOCAINE HYDROCHLORIDE 10 MG/ML
0.5 INJECTION, SOLUTION INFILTRATION; PERINEURAL ONCE AS NEEDED
Status: CANCELLED | OUTPATIENT
Start: 2025-05-20

## 2025-05-20 RX ORDER — SODIUM CHLORIDE, SODIUM LACTATE, POTASSIUM CHLORIDE, CALCIUM CHLORIDE 600; 310; 30; 20 MG/100ML; MG/100ML; MG/100ML; MG/100ML
75 INJECTION, SOLUTION INTRAVENOUS CONTINUOUS
Status: DISCONTINUED | OUTPATIENT
Start: 2025-05-20 | End: 2025-05-21

## 2025-05-20 RX ORDER — TRANEXAMIC ACID 1 G/10ML
1000 INJECTION, SOLUTION INTRAVENOUS ONCE AS NEEDED
Status: DISCONTINUED | OUTPATIENT
Start: 2025-05-20 | End: 2025-05-21

## 2025-05-20 RX ORDER — NIFEDIPINE 10 MG/1
10 CAPSULE ORAL ONCE AS NEEDED
Status: DISCONTINUED | OUTPATIENT
Start: 2025-05-20 | End: 2025-05-21

## 2025-05-20 RX ORDER — HYDRALAZINE HYDROCHLORIDE 20 MG/ML
5 INJECTION INTRAMUSCULAR; INTRAVENOUS ONCE AS NEEDED
Status: DISCONTINUED | OUTPATIENT
Start: 2025-05-20 | End: 2025-05-21

## 2025-05-20 RX ORDER — ALBUTEROL SULFATE 90 UG/1
2 INHALANT RESPIRATORY (INHALATION) EVERY 6 HOURS PRN
Status: DISCONTINUED | OUTPATIENT
Start: 2025-05-20 | End: 2025-05-22 | Stop reason: HOSPADM

## 2025-05-20 RX ORDER — METHYLERGONOVINE MALEATE 0.2 MG/ML
0.2 INJECTION INTRAVENOUS ONCE AS NEEDED
Status: DISCONTINUED | OUTPATIENT
Start: 2025-05-20 | End: 2025-05-21

## 2025-05-20 RX ORDER — ONDANSETRON 4 MG/1
4 TABLET, FILM COATED ORAL EVERY 6 HOURS PRN
Status: DISCONTINUED | OUTPATIENT
Start: 2025-05-20 | End: 2025-05-21

## 2025-05-20 RX ORDER — ESCITALOPRAM OXALATE 10 MG/1
10 TABLET ORAL DAILY
Status: DISCONTINUED | OUTPATIENT
Start: 2025-05-21 | End: 2025-05-22 | Stop reason: HOSPADM

## 2025-05-20 RX ORDER — LABETALOL HYDROCHLORIDE 5 MG/ML
20 INJECTION, SOLUTION INTRAVENOUS ONCE AS NEEDED
Status: DISCONTINUED | OUTPATIENT
Start: 2025-05-20 | End: 2025-05-21

## 2025-05-20 RX ORDER — OXYTOCIN/0.9 % SODIUM CHLORIDE 30/500 ML
2-30 PLASTIC BAG, INJECTION (ML) INTRAVENOUS CONTINUOUS
Status: DISCONTINUED | OUTPATIENT
Start: 2025-05-20 | End: 2025-05-21

## 2025-05-20 RX ADMIN — SODIUM CHLORIDE, POTASSIUM CHLORIDE, SODIUM LACTATE AND CALCIUM CHLORIDE 75 ML/HR: 600; 310; 30; 20 INJECTION, SOLUTION INTRAVENOUS at 10:15

## 2025-05-20 RX ADMIN — Medication 10 ML/HR: at 23:28

## 2025-05-20 RX ADMIN — Medication 5 ML: at 14:18

## 2025-05-20 RX ADMIN — Medication 2 MILLI-UNITS/MIN: at 10:04

## 2025-05-20 RX ADMIN — Medication 10 ML/HR: at 14:17

## 2025-05-20 RX ADMIN — SODIUM CHLORIDE, SODIUM LACTATE, POTASSIUM CHLORIDE, AND CALCIUM CHLORIDE 500 ML: 600; 310; 30; 20 INJECTION, SOLUTION INTRAVENOUS at 13:44

## 2025-05-20 SDOH — HEALTH STABILITY: MENTAL HEALTH
DO YOU FEEL STRESS - TENSE, RESTLESS, NERVOUS, OR ANXIOUS, OR UNABLE TO SLEEP AT NIGHT BECAUSE YOUR MIND IS TROUBLED ALL THE TIME - THESE DAYS?: NOT AT ALL

## 2025-05-20 SDOH — HEALTH STABILITY: MENTAL HEALTH: NON-SPECIFIC ACTIVE SUICIDAL THOUGHTS (PAST 1 MONTH): NO

## 2025-05-20 SDOH — SOCIAL STABILITY: SOCIAL INSECURITY: WITHIN THE LAST YEAR, HAVE YOU BEEN AFRAID OF YOUR PARTNER OR EX-PARTNER?: NO

## 2025-05-20 SDOH — SOCIAL STABILITY: SOCIAL INSECURITY
WITHIN THE LAST YEAR, HAVE YOU BEEN RAPED OR FORCED TO HAVE ANY KIND OF SEXUAL ACTIVITY BY YOUR PARTNER OR EX-PARTNER?: NO

## 2025-05-20 SDOH — HEALTH STABILITY: MENTAL HEALTH: SUICIDAL BEHAVIOR (LIFETIME): NO

## 2025-05-20 SDOH — HEALTH STABILITY: MENTAL HEALTH: WISH TO BE DEAD (PAST 1 MONTH): NO

## 2025-05-20 SDOH — SOCIAL STABILITY: SOCIAL INSECURITY
WITHIN THE LAST YEAR, HAVE YOU BEEN KICKED, HIT, SLAPPED, OR OTHERWISE PHYSICALLY HURT BY YOUR PARTNER OR EX-PARTNER?: NO

## 2025-05-20 SDOH — ECONOMIC STABILITY: HOUSING INSECURITY: DO YOU FEEL UNSAFE GOING BACK TO THE PLACE WHERE YOU ARE LIVING?: NO

## 2025-05-20 SDOH — SOCIAL STABILITY: SOCIAL INSECURITY: WITHIN THE LAST YEAR, HAVE YOU BEEN HUMILIATED OR EMOTIONALLY ABUSED IN OTHER WAYS BY YOUR PARTNER OR EX-PARTNER?: NO

## 2025-05-20 SDOH — SOCIAL STABILITY: SOCIAL INSECURITY: PHYSICAL ABUSE: DENIES

## 2025-05-20 SDOH — ECONOMIC STABILITY: FOOD INSECURITY: WITHIN THE PAST 12 MONTHS, YOU WORRIED THAT YOUR FOOD WOULD RUN OUT BEFORE YOU GOT THE MONEY TO BUY MORE.: NEVER TRUE

## 2025-05-20 SDOH — ECONOMIC STABILITY: FOOD INSECURITY: WITHIN THE PAST 12 MONTHS, THE FOOD YOU BOUGHT JUST DIDN'T LAST AND YOU DIDN'T HAVE MONEY TO GET MORE.: NEVER TRUE

## 2025-05-20 SDOH — SOCIAL STABILITY: SOCIAL INSECURITY: ARE YOU OR HAVE YOU BEEN THREATENED OR ABUSED PHYSICALLY, EMOTIONALLY, OR SEXUALLY BY ANYONE?: NO

## 2025-05-20 SDOH — HEALTH STABILITY: MENTAL HEALTH: HAVE YOU USED ANY PRESCRIPTION DRUGS OTHER THAN PRESCRIBED IN THE PAST 12 MONTHS?: NO

## 2025-05-20 SDOH — SOCIAL STABILITY: SOCIAL INSECURITY: HAVE YOU HAD ANY THOUGHTS OF HARMING ANYONE ELSE?: NO

## 2025-05-20 SDOH — HEALTH STABILITY: MENTAL HEALTH: WERE YOU ABLE TO COMPLETE ALL THE BEHAVIORAL HEALTH SCREENINGS?: YES

## 2025-05-20 SDOH — SOCIAL STABILITY: SOCIAL INSECURITY: DOES ANYONE TRY TO KEEP YOU FROM HAVING/CONTACTING OTHER FRIENDS OR DOING THINGS OUTSIDE YOUR HOME?: NO

## 2025-05-20 SDOH — SOCIAL STABILITY: SOCIAL INSECURITY: VERBAL ABUSE: DENIES

## 2025-05-20 SDOH — SOCIAL STABILITY: SOCIAL INSECURITY: HAVE YOU HAD THOUGHTS OF HARMING ANYONE ELSE?: NO

## 2025-05-20 SDOH — SOCIAL STABILITY: SOCIAL INSECURITY: ARE THERE ANY APPARENT SIGNS OF INJURIES/BEHAVIORS THAT COULD BE RELATED TO ABUSE/NEGLECT?: NO

## 2025-05-20 SDOH — SOCIAL STABILITY: SOCIAL INSECURITY: DO YOU FEEL ANYONE HAS EXPLOITED OR TAKEN ADVANTAGE OF YOU FINANCIALLY OR OF YOUR PERSONAL PROPERTY?: NO

## 2025-05-20 SDOH — SOCIAL STABILITY: SOCIAL INSECURITY: HAS ANYONE EVER THREATENED TO HURT YOUR FAMILY OR YOUR PETS?: NO

## 2025-05-20 SDOH — SOCIAL STABILITY: SOCIAL INSECURITY: ABUSE SCREEN: ADULT

## 2025-05-20 SDOH — HEALTH STABILITY: MENTAL HEALTH: HAVE YOU USED ANY SUBSTANCES (CANABIS, COCAINE, HEROIN, HALLUCINOGENS, INHALANTS, ETC.) IN THE PAST 12 MONTHS?: NO

## 2025-05-20 ASSESSMENT — PAIN SCALES - GENERAL

## 2025-05-20 ASSESSMENT — PATIENT HEALTH QUESTIONNAIRE - PHQ9
SUM OF ALL RESPONSES TO PHQ9 QUESTIONS 1 & 2: 0
2. FEELING DOWN, DEPRESSED OR HOPELESS: NOT AT ALL
1. LITTLE INTEREST OR PLEASURE IN DOING THINGS: NOT AT ALL

## 2025-05-20 ASSESSMENT — LIFESTYLE VARIABLES
SKIP TO QUESTIONS 9-10: 1
HOW MANY STANDARD DRINKS CONTAINING ALCOHOL DO YOU HAVE ON A TYPICAL DAY: PATIENT DOES NOT DRINK
AUDIT-C TOTAL SCORE: 0
AUDIT-C TOTAL SCORE: 0
HOW OFTEN DO YOU HAVE 6 OR MORE DRINKS ON ONE OCCASION: NEVER
HOW OFTEN DO YOU HAVE A DRINK CONTAINING ALCOHOL: NEVER

## 2025-05-20 ASSESSMENT — ACTIVITIES OF DAILY LIVING (ADL): LACK_OF_TRANSPORTATION: NO

## 2025-05-20 NOTE — ANESTHESIA PREPROCEDURE EVALUATION
Patient: Kassy King    Evaluation Method: In-person visit    Procedure Information    Date: 05/20/25  Procedure: Labor Analgesia         Relevant Problems   Anesthesia (within normal limits)      Cardiac  Hx of WPW as a child, s/p ablation   (+) Elevated blood pressure affecting pregnancy in third trimester, antepartum   (+) High cholesterol      Pulmonary   (+) Asthma affecting pregnancy in first trimester ([facility])   (+) Mild intermittent asthma   (+) Recent URI (resolved)      Neuro   (+) Depression affecting pregnancy in first trimester, antepartum ([facility])   (+) Recurrent major depressive disorder, in full remission      Hematology   (+) Anemia      GYN   (+) 37 weeks gestation of pregnancy ([facility])       Clinical information reviewed:    Allergies  Meds  Problems  Med Hx  Surg Hx            NPO Detail:  No data recorded     OB/Gyn Evaluation    Present Pregnancy    Patient is pregnant now.   Obstetric History    (+) hypertensive disorder of pregnancy - gestational hypertension              Physical Exam    Airway  Mallampati: II  TM distance: >3 FB  Mouth opening: 3 or more finger widths     Cardiovascular    Dental     (+) implants     Pulmonary    Abdominal            Anesthesia Plan    History of general anesthesia?: yes  History of complications of general anesthesia?: no    ASA 2     epidural     Anesthetic plan and risks discussed with patient.  Use of blood products discussed with patient who consented to blood products.    Plan discussed with CAA.

## 2025-05-20 NOTE — ANESTHESIA PROCEDURE NOTES
Epidural Block    Patient location during procedure: floor  Start time: 5/20/2025 1:55 PM  End time: 5/20/2025 2:07 PM  Reason for block: labor analgesia  Staffing  Performed: JOSEPH   Authorized by: Bolivar Block DO    Performed by: MJ [place]    Preanesthetic Checklist  Completed: patient identified, IV checked, risks and benefits discussed, surgical consent, pre-op evaluation, timeout performed and sterile techniques followed  Block Timeout  RN/Licensed healthcare professional reads aloud to the Anesthesia provider and entire team: Patient identity, procedure with side and site, patient position, and as applicable the availability of implants/special equipment/special requirements.  Patient on coagulant treatment: no  Timeout performed at: 5/20/2025 1:56 PM  Block Placement  Patient position: sitting  Prep: ChloraPrep  Sterility prep: cap, drape, gloves and mask  Sedation level: no sedation  Patient monitoring: blood pressure, continuous pulse oximetry and heart rate  Approach: midline  Local numbing: lidocaine 1% to skin and subcutaneous tissues  Vertebral space: lumbar  Lumbar location: L3-L4  Epidural  Loss of resistance technique: saline  Guidance: landmark technique        Needle  Needle type: Tuohy   Needle gauge: 17  Needle length: 8.9cm  Needle insertion depth: 4.5 cm  Catheter type: multi-orifice  Catheter size: 19 G  Catheter at skin depth: 9.5 cm  Catheter securement method: clear occlusive dressing and liquid medical adhesive    Test dose: lidocaine 1.5% with epinephrine 1-to-200,000  Test dose: lidocaine 1.5% with epinephrine 1-to-200,000  Test dose result: no positive test dose    PCEA  Medication concentration used: 0.2% Ropivacaine with 2 mcg/mL Fentanyl  Dose (mL): 5  Lockout (minutes): 30  1-Hour Limit (boluses/hr): 2  Basal Rate: 10        Assessment  Block outcome: patient comfortable  Events: no positive test dose  Procedure assessment: patient tolerated procedure well with no  immediate complications

## 2025-05-20 NOTE — SIGNIFICANT EVENT
LABOR PROGRESS NOTE  SUBJECTIVE  Patient doing well with pitocin infusing. Feeling intermittent contractions.     OBJECTIVE  Visit Vitals  /77   Pulse 75   Temp 36.4 °C (97.5 °F) (Temporal)   Resp 18        Cervical Exam  Dilation: 3  Effacement (%): 60  Fetal Station: -3  OB Examiner: Lloyd HINDS  Fetal Assessment  Movement: Present  Mode: External US  Doppler/Fetoscope Rate: 135 BPM  Baseline Fetal Heart Rate (bpm): 145 bpm  Baseline Classification: Normal  Variability: Moderate (Between 6 and 25 BPM)  Pattern: Accelerations   Fetal Decelerations: No  Contraction Frequency: q4-6    A&P    IOL  Labor Course  1000 3/60/-3, Pit  1145 3/60/-3, AROM clear    - Continue to monitor for cervical change  - continue pitocin    Elevated BP at term, concern for gHTN  -Pt with elevated BP of 150/88 in office 5/19, per patient additional mild range BP @ home this AM  -Normotensive on arrival, continue to monitor BP. If additional mild range BP, will meet criteria for gHTN  - HELLP labs neg x 1, P:C 0.18  -Asymptomatic, no si/sx sPEC     Anemia  - admission hg 9.2, T&C 1u    Amine MD Lloyd

## 2025-05-20 NOTE — H&P
OB Admission H&P    Assessment/Plan    Kassy King is a 34 y.o.  at 37w6d, ZAKIA: 2025, by Last Menstrual Period, who presents for Induction of Labor in the setting of elevated BP at term, c/f gHTN.    Plan  -Admit to L&D, consented  -T&S, CBC, and Syphilis ordered on admission  -Epidural at patient request  -Recheck as clinically indicated by maternal or fetal status  -Plan to initiate induction with pitocin and AROM when appropriate  -SVE 2.5 in office prior to admission on     Elevated BP at term, concern for gHTN  -Pt with elevated BP of 150/88 in office , per patient additional mild range BP @ home this AM  -Normotensive on arrival, continue to monitor BP. If additional mild range BP, will meet criteria for gHTN. Given patient now term with new onset elevated blood pressures in the setting of no HTN diagnosis outside of pregnancy and known prior history of gHTN in last pregnancy, suspect likely HDP and will proceed with IOL as above.  -HELLP labs ordered on admission, follow up results  -Asymptomatic, no si/sx sPEC    Fetal Status  -NST reactive, reassuring   -Presentation cephalic based on ultrasound  -EFW 3084g extrapolated from Growth US on   -GBS negative    Postpartum  Contraception Plan: patient declined    Pregnancy Notables  - Umbilical vein verix, weekly BPPs; unchanged from previous exams at 9.3 mm on  US  - gHTN in P2, taking baby ASA  - 1 MR BP (150/88)  in office on   - Asthma  - Depression on Lexapro and Wellbutrin    Plan of care d/w Dr. Malik Pinto MD PGY-4    Pregnancy Problems (from 24 to present)       Problem Noted Diagnosed Resolved    Elevated blood pressure affecting pregnancy in third trimester, antepartum 2025 by Enedina Kay MD  No    Priority:  Medium       Overview Signed 2025  4:37 PM by Enedina Kay MD   /88 at 37.5wga office visit.          Umbilical vein abnormality affecting pregnancy 2025 by Enedina  GRAHAM Kay MD  No    Priority:  Medium       Overview Signed 4/18/2025  1:19 PM by Enedina Kay MD   32 weeks: umbilical vein varix measuring 1.3 x 1.3 x 1.2 cm . Recommended weekly BPPs with DV Dopplers          Polyhydramnios in third trimester ([facility]) 4/11/2025 by Enedina Kay MD  No    Priority:  Medium       Overview Addendum 5/5/2025  4:34 PM by Enedina Kay MD   mild polyhydramnios is noted with a MVP of8.2 and ANASTASIA of 25.7cm   25.4cm on recent US, stable.          Fundal height low for dates in third trimester ([facility]) 3/24/2025 by Enedina Kay MD  No    Priority:  Medium       Overview Signed 3/24/2025  9:16 AM by Enedina Kay MD   29wks measuring 24wks fundal height. Had IUGR baby in P1, however likely constitutionally small given maternal & paternal size.          Pelvic pain affecting pregnancy in third trimester, antepartum ([facility]) 3/24/2025 by Enedina Kay MD  No    Priority:  Medium       Overview Signed 3/24/2025  9:15 AM by Enedina Kay MD   Similar to previous pregnancy, open to PT.          37 weeks gestation of pregnancy ([facility]) 11/11/2024 by Enedina Kay MD  No    Priority:  Medium       Overview Addendum 5/19/2025  4:46 PM by Enedina Kay MD   Desired provider in labor: [] CNM  [x] Physician  [x] Blood Products: [x] Yes, accepts [] No, needs counseling  [x] Initial BMI: Could not be calculated   [x] Prenatal Labs: reviewed  [x] Cervical Cancer Screening up to date  [x] Rh status: +  [x] Genetic Screening: [employer] reviewed  [x] NT US: (11-13 wks) wnl  [x] Baby ASA (if indicated): taking, had pp gHTN in P2  [x] Pregnancy dated by: LMP cw 7wk US    [x] Anatomy US: (19-20 wks) reviewed  [] Federal Sterilization consent signed (if indicated):  [x] 1hr GCT at 24-28wks: 106  [x] Fetal Surveillance (if indicated): weekly BPPs for umbilical vein varix  [x] Tdap (27-32 wks, may be given up to 36 wks if initial window missed): 3/24  [x] Flu Vaccine: pt reports did get this  "year    [] Breastfeeding:  [] Postpartum Birth control method:   [x] GBS at 36 - 37 wks: neg  [x] 39 weeks discussion of IOL vs. Expectant management: will do IOL for elevated BP, timing pending scheduling  [x] Mode of delivery ( anticipated ): vaginal         Asthma affecting pregnancy in first trimester ([facility]) 2024 by Enedina Kay MD  No    Priority:  Medium       Overview Addendum 2024  4:03 PM by Enedina Kay MD   Triggered by allergies, pet dander.   prn antihistamine and albuterol.         Depression affecting pregnancy in first trimester, antepartum ([facility]) 2024 by Enedina Kay MD  No    Priority:  Medium       Overview Signed 2024  3:28 PM by Enedina Kay MD   on escitalopram 10mg daily & Wellbutrin 150mg daily         Hx of maternal laceration, 3rd degree, currently pregnant ([facility]) 2024 by Enedina Kay MD  No    Priority:  Medium       Overview Signed 2024  4:04 PM by Enedina Kay MD   Was told she had a \"big 2nd degree\" but she is concerned it was a 3rd.  Discussed option of having UroGyn do her perineal repair this time if that makes her feel more comfortable.          Vaginal discharge during pregnancy in second trimester ([facility]) 2024 by Enedina Kay MD  2/3/2025 by Enedina Kay MD    14 weeks gestation of pregnancy ([facility]) 2024 by Enedina Kay MD  2024 by Enedina Kay MD            Subjective   Good fetal movement.  Denies vaginal bleeding. Denies HA RUQ pain or blurry vision. Here today with her , excited for delivery.    Prenatal Provider Dr. Kay    OB History    Para Term  AB Living   3 1 1 0 1 1   SAB IAB Ectopic Multiple Live Births   1 0 0 0 1      # Outcome Date GA Lbr Vinicius/2nd Weight Sex Type Anes PTL Lv   3 Current            2 Term 23    F Vag-Spont EPI N KAYLENE   1 2021 7w0d              Surgical History[1]    Social History     Tobacco Use    Smoking status: Never    " Smokeless tobacco: Never   Substance Use Topics    Alcohol use: Not Currently       Allergies[2]    Prescriptions Prior to Admission[3]  Objective     Last Vitals  Temp Pulse Resp BP MAP O2 Sat   36.4 °C (97.5 °F) 86 18 128/83 101 99 %     Blood Pressures         2025  0908             BP: 128/83             Physical Exam  General: NAD, mood appropriate  Cardiopulmonary: warm and well perfused, breathing comfortably on room air  Abdomen: Gravid, non-tender  Extremities: Symmetric       Fetal Monitoring  Baseline: 140 bpm, Variability: moderate,  Accelerations: present and Decelerations: none  Uterine Activity: Irregular contractions  Interpretation: Reactive    Bedside ultrasound: Yes    Labs in chart were reviewed.  CBC     CMP       PCR             Prenatal labs reviewed, not remarkable.             [1]   Past Surgical History:  Procedure Laterality Date    ABLATION OF DYSRHYTHMIC FOCUS      WPW    D&C FIRST TRIMESTER / TX INCOMPLETE / MISSED / SEPTIC / INDUCED       Spontaneous first semester miscarriage with first pregnancy    TONSILLECTOMY      WISDOM TOOTH EXTRACTION     [2] No Known Allergies  [3]   Facility-Administered Medications Prior to Admission   Medication Dose Route Frequency Provider Last Rate Last Admin    phenazopyridine (Pyridium) tablet 100 mg  100 mg oral Once PRN Enedina Kay MD         Medications Prior to Admission   Medication Sig Dispense Refill Last Dose/Taking    albuterol 90 mcg/actuation inhaler Inhale 2 puffs every 6 hours if needed for wheezing.   More than a month    buPROPion XL (Wellbutrin XL) 150 mg 24 hr tablet TAKE 1 TABLET (150 MG) BY MOUTH ONCE DAILY IN THE MORNING. 90 tablet 3 2025 Morning    escitalopram (Lexapro) 10 mg tablet TAKE 1 TABLET (10 MG) BY MOUTH DAILY IN THE MORNING 90 tablet 3 2025 Morning    ferrous sulfate tablet Take 1 tablet (325 mg) by mouth once daily with breakfast. 30 tablet 11 2025 Morning     prenat.vits,bruno,min-iron-folic tablet Take by mouth.   5/20/2025 Morning    blood pressure monitor (Blood Pressure Kit) kit 1 each once daily. 1 kit 0     loratadine (Claritin) 10 mg tablet Take 1 tablet (10 mg) by mouth if needed for allergies.

## 2025-05-20 NOTE — PROGRESS NOTES
Intrapartum Progress Note    Assessment/Plan   Kassy King is a 34 y.o.  at 37w6d by LMP c/w 7w US admitted for IOL in s/o elevated BP at term    IOL  - Latent labor, s/p AROM, on pit, continue pit per protocol  - Encourage position changes  - Epidural in place  - Starting Hgb 9.2, T&C 1UPRBC    Elevated BP at term  - Mild range BP in office , none since admission. If additional elevated BP, will meet criteria for gHTN  - HELLP labs unremarkable  - Asx    Fetal status  - CEFM, cat 1 currently  - EFW 3100g ext from growth   - GBS neg, PCN not indicated    PPBC: declines    Pregnancy notables  - Umbilical vein varix, weekly BPPs; unchanged from previous exams at 9.3 mm on  US  - gHTN in P2, taking baby ASA  - Asthma, avoid hemabate  - Depression on Lexapro and Wellbutrin, continue    Seen and d/w Dr. Robert Schreiber MD  PGY-4, Obstetrics and Gynecology    Subjective   Feeling well with epidural in place, no other concerns at this time    Objective   Last Vitals:  Temp Pulse Resp BP MAP Pulse Ox   36 °C (96.8 °F) 58 18 127/70 93 100 %     Vitals Min/Max Last 24 Hours:  Temp  Min: 36 °C (96.8 °F)  Max: 36.5 °C (97.7 °F)  Pulse  Min: 58  Max: 86  Resp  Min: 18  Max: 18  BP  Min: 110/63  Max: 131/77  MAP (mmHg)  Min: 80  Max: 101    Intake/Output:    Intake/Output Summary (Last 24 hours) at 2025 1804  Last data filed at 2025 1638  Gross per 24 hour   Intake --   Output 150 ml   Net -150 ml     Physical Examination:  General Appearance: no acute distress  Skin: no rashes or lesions appreciated  Lungs: normal work of breathing  Heart: warm, well perfused  Abdomen: gravid  Extremities: no edema appreciated  Musculoskeletal: normal ROM  Neurologic: no gross deficits  Psych exam: normal mood and affect    SVE: 3/60/-3    CEFM: baseline 130, moderate variability, accels present, no decels  [place]: q2-3min    Chaperone Present: Yes.  Chaperone Name/Title: Daily Walker  RN  Examination Chaperoned: Entire Physical Exam    Lab Review:  Labs in chart were reviewed.

## 2025-05-21 LAB
BASE EXCESS BLDCOA CALC-SCNC: -1 MMOL/L (ref -10.8–-0.5)
BASE EXCESS BLDCOV CALC-SCNC: -1.8 MMOL/L (ref -8.1–-0.5)
BODY TEMPERATURE: 37 DEGREES CELSIUS
BODY TEMPERATURE: 37 DEGREES CELSIUS
HCO3 BLDCOA-SCNC: 27.9 MMOL/L (ref 15–29)
HCO3 BLDCOV-SCNC: 23.7 MMOL/L (ref 16–26)
INHALED O2 CONCENTRATION: 21 %
INHALED O2 CONCENTRATION: 21 %
OXYHGB MFR BLDCOA: NORMAL %
OXYHGB MFR BLDCOV: 72.5 % (ref 94–98)
PCO2 BLDCOA: 65 MM HG (ref 31–75)
PCO2 BLDCOV: 42 MM HG (ref 22–53)
PH BLDCOA: 7.24 PH (ref 7.08–7.39)
PH BLDCOV: 7.36 PH (ref 7.19–7.47)
PO2 BLDCOA: 16 MM HG (ref 5–31)
PO2 BLDCOV: 37 MM HG (ref 13–37)
SAO2 % BLDCOA: NORMAL %
SAO2 % BLDCOV: 75 % (ref 16–84)

## 2025-05-21 PROCEDURE — 82810 BLOOD GASES O2 SAT ONLY: CPT | Performed by: STUDENT IN AN ORGANIZED HEALTH CARE EDUCATION/TRAINING PROGRAM

## 2025-05-21 PROCEDURE — 0KQM0ZZ REPAIR PERINEUM MUSCLE, OPEN APPROACH: ICD-10-PCS | Performed by: OBSTETRICS & GYNECOLOGY

## 2025-05-21 PROCEDURE — 51701 INSERT BLADDER CATHETER: CPT

## 2025-05-21 PROCEDURE — 2500000004 HC RX 250 GENERAL PHARMACY W/ HCPCS (ALT 636 FOR OP/ED)

## 2025-05-21 PROCEDURE — 2500000001 HC RX 250 WO HCPCS SELF ADMINISTERED DRUGS (ALT 637 FOR MEDICARE OP)

## 2025-05-21 PROCEDURE — 1210000001 HC SEMI-PRIVATE ROOM DAILY

## 2025-05-21 PROCEDURE — 2500000004 HC RX 250 GENERAL PHARMACY W/ HCPCS (ALT 636 FOR OP/ED): Mod: JZ | Performed by: STUDENT IN AN ORGANIZED HEALTH CARE EDUCATION/TRAINING PROGRAM

## 2025-05-21 PROCEDURE — 59409 OBSTETRICAL CARE: CPT | Performed by: OBSTETRICS & GYNECOLOGY

## 2025-05-21 PROCEDURE — 7210000002 HC LABOR PER HOUR

## 2025-05-21 PROCEDURE — 7100000016 HC LABOR RECOVERY PER HOUR

## 2025-05-21 PROCEDURE — 2500000005 HC RX 250 GENERAL PHARMACY W/O HCPCS

## 2025-05-21 PROCEDURE — 59410 OBSTETRICAL CARE: CPT | Performed by: STUDENT IN AN ORGANIZED HEALTH CARE EDUCATION/TRAINING PROGRAM

## 2025-05-21 RX ORDER — DIPHENHYDRAMINE HYDROCHLORIDE 50 MG/ML
25 INJECTION, SOLUTION INTRAMUSCULAR; INTRAVENOUS EVERY 6 HOURS PRN
Status: DISCONTINUED | OUTPATIENT
Start: 2025-05-21 | End: 2025-05-22 | Stop reason: HOSPADM

## 2025-05-21 RX ORDER — SIMETHICONE 80 MG
80 TABLET,CHEWABLE ORAL 4 TIMES DAILY PRN
Status: DISCONTINUED | OUTPATIENT
Start: 2025-05-21 | End: 2025-05-22 | Stop reason: HOSPADM

## 2025-05-21 RX ORDER — DIPHENHYDRAMINE HCL 25 MG
25 CAPSULE ORAL EVERY 6 HOURS PRN
Status: DISCONTINUED | OUTPATIENT
Start: 2025-05-21 | End: 2025-05-22 | Stop reason: HOSPADM

## 2025-05-21 RX ORDER — FENTANYL CITRATE 50 UG/ML
INJECTION, SOLUTION INTRAMUSCULAR; INTRAVENOUS AS NEEDED
Status: DISCONTINUED | OUTPATIENT
Start: 2025-05-21 | End: 2025-05-21

## 2025-05-21 RX ORDER — LABETALOL HYDROCHLORIDE 5 MG/ML
20 INJECTION, SOLUTION INTRAVENOUS ONCE AS NEEDED
Status: DISCONTINUED | OUTPATIENT
Start: 2025-05-21 | End: 2025-05-22 | Stop reason: HOSPADM

## 2025-05-21 RX ORDER — METHYLERGONOVINE MALEATE 0.2 MG/ML
0.2 INJECTION INTRAVENOUS ONCE AS NEEDED
Status: DISCONTINUED | OUTPATIENT
Start: 2025-05-21 | End: 2025-05-22 | Stop reason: HOSPADM

## 2025-05-21 RX ORDER — IBUPROFEN 600 MG/1
600 TABLET, FILM COATED ORAL EVERY 6 HOURS
Status: DISCONTINUED | OUTPATIENT
Start: 2025-05-21 | End: 2025-05-22 | Stop reason: HOSPADM

## 2025-05-21 RX ORDER — HYDRALAZINE HYDROCHLORIDE 20 MG/ML
5 INJECTION INTRAMUSCULAR; INTRAVENOUS ONCE AS NEEDED
Status: DISCONTINUED | OUTPATIENT
Start: 2025-05-21 | End: 2025-05-22 | Stop reason: HOSPADM

## 2025-05-21 RX ORDER — TRANEXAMIC ACID 1 G/10ML
1000 INJECTION, SOLUTION INTRAVENOUS ONCE AS NEEDED
Status: DISCONTINUED | OUTPATIENT
Start: 2025-05-21 | End: 2025-05-22 | Stop reason: HOSPADM

## 2025-05-21 RX ORDER — MISOPROSTOL 200 UG/1
800 TABLET ORAL ONCE AS NEEDED
Status: DISCONTINUED | OUTPATIENT
Start: 2025-05-21 | End: 2025-05-22 | Stop reason: HOSPADM

## 2025-05-21 RX ORDER — POLYETHYLENE GLYCOL 3350 17 G/17G
17 POWDER, FOR SOLUTION ORAL 2 TIMES DAILY PRN
Status: DISCONTINUED | OUTPATIENT
Start: 2025-05-21 | End: 2025-05-22 | Stop reason: HOSPADM

## 2025-05-21 RX ORDER — OXYTOCIN 10 [USP'U]/ML
10 INJECTION, SOLUTION INTRAMUSCULAR; INTRAVENOUS ONCE AS NEEDED
Status: DISCONTINUED | OUTPATIENT
Start: 2025-05-21 | End: 2025-05-22 | Stop reason: HOSPADM

## 2025-05-21 RX ORDER — ONDANSETRON HYDROCHLORIDE 2 MG/ML
4 INJECTION, SOLUTION INTRAVENOUS EVERY 6 HOURS PRN
Status: DISCONTINUED | OUTPATIENT
Start: 2025-05-21 | End: 2025-05-22 | Stop reason: HOSPADM

## 2025-05-21 RX ORDER — NIFEDIPINE 10 MG/1
10 CAPSULE ORAL ONCE AS NEEDED
Status: DISCONTINUED | OUTPATIENT
Start: 2025-05-21 | End: 2025-05-22 | Stop reason: HOSPADM

## 2025-05-21 RX ORDER — LIDOCAINE HYDROCHLORIDE AND EPINEPHRINE 15; 5 MG/ML; UG/ML
INJECTION, SOLUTION EPIDURAL AS NEEDED
Status: DISCONTINUED | OUTPATIENT
Start: 2025-05-21 | End: 2025-05-21

## 2025-05-21 RX ORDER — LOPERAMIDE HYDROCHLORIDE 2 MG/1
4 CAPSULE ORAL EVERY 2 HOUR PRN
Status: DISCONTINUED | OUTPATIENT
Start: 2025-05-21 | End: 2025-05-22 | Stop reason: HOSPADM

## 2025-05-21 RX ORDER — ADHESIVE BANDAGE
10 BANDAGE TOPICAL
Status: DISCONTINUED | OUTPATIENT
Start: 2025-05-21 | End: 2025-05-22 | Stop reason: HOSPADM

## 2025-05-21 RX ORDER — CARBOPROST TROMETHAMINE 250 UG/ML
250 INJECTION, SOLUTION INTRAMUSCULAR ONCE AS NEEDED
Status: DISCONTINUED | OUTPATIENT
Start: 2025-05-21 | End: 2025-05-22 | Stop reason: HOSPADM

## 2025-05-21 RX ORDER — BISACODYL 10 MG/1
10 SUPPOSITORY RECTAL DAILY PRN
Status: DISCONTINUED | OUTPATIENT
Start: 2025-05-21 | End: 2025-05-22 | Stop reason: HOSPADM

## 2025-05-21 RX ORDER — ONDANSETRON 4 MG/1
4 TABLET, FILM COATED ORAL EVERY 6 HOURS PRN
Status: DISCONTINUED | OUTPATIENT
Start: 2025-05-21 | End: 2025-05-22 | Stop reason: HOSPADM

## 2025-05-21 RX ORDER — ACETAMINOPHEN 325 MG/1
975 TABLET ORAL EVERY 6 HOURS
Status: DISCONTINUED | OUTPATIENT
Start: 2025-05-21 | End: 2025-05-22 | Stop reason: HOSPADM

## 2025-05-21 RX ADMIN — ACETAMINOPHEN 975 MG: 325 TABLET ORAL at 12:43

## 2025-05-21 RX ADMIN — IBUPROFEN 600 MG: 600 TABLET, FILM COATED ORAL at 12:43

## 2025-05-21 RX ADMIN — ESCITALOPRAM OXALATE 10 MG: 10 TABLET ORAL at 12:43

## 2025-05-21 RX ADMIN — LIDOCAINE HYDROCHLORIDE AND EPINEPHRINE 3 ML: 15; 5 INJECTION, SOLUTION EPIDURAL at 04:01

## 2025-05-21 RX ADMIN — POLYETHYLENE GLYCOL 3350 17 G: 17 POWDER, FOR SOLUTION ORAL at 12:43

## 2025-05-21 RX ADMIN — ACETAMINOPHEN 975 MG: 325 TABLET ORAL at 06:29

## 2025-05-21 RX ADMIN — IBUPROFEN 600 MG: 600 TABLET, FILM COATED ORAL at 18:23

## 2025-05-21 RX ADMIN — LIDOCAINE HYDROCHLORIDE AND EPINEPHRINE 3 ML: 15; 5 INJECTION, SOLUTION EPIDURAL at 03:51

## 2025-05-21 RX ADMIN — WITCH HAZEL 1 EACH: 500 SOLUTION RECTAL; TOPICAL at 12:44

## 2025-05-21 RX ADMIN — Medication 60 MILLI-UNITS/MIN: at 05:19

## 2025-05-21 RX ADMIN — FENTANYL CITRATE 100 MCG: 50 INJECTION, SOLUTION INTRAMUSCULAR; INTRAVENOUS at 04:01

## 2025-05-21 RX ADMIN — BENZOCAINE AND LEVOMENTHOL 1 APPLICATION: 200; 5 SPRAY TOPICAL at 12:44

## 2025-05-21 RX ADMIN — ACETAMINOPHEN 975 MG: 325 TABLET ORAL at 18:24

## 2025-05-21 RX ADMIN — IBUPROFEN 600 MG: 600 TABLET, FILM COATED ORAL at 06:29

## 2025-05-21 ASSESSMENT — PAIN SCALES - GENERAL
PAINLEVEL_OUTOF10: 0 - NO PAIN
PAINLEVEL_OUTOF10: 0 - NO PAIN
PAINLEVEL_OUTOF10: 2
PAINLEVEL_OUTOF10: 0 - NO PAIN
PAINLEVEL_OUTOF10: 2
PAINLEVEL_OUTOF10: 10 - WORST POSSIBLE PAIN
PAINLEVEL_OUTOF10: 0 - NO PAIN
PAINLEVEL_OUTOF10: 5 - MODERATE PAIN

## 2025-05-21 ASSESSMENT — PAIN DESCRIPTION - DESCRIPTORS: DESCRIPTORS: CRAMPING

## 2025-05-21 NOTE — SIGNIFICANT EVENT
"Labor Progress Note    Subjective: Feeling comfortable with epidural, not feeling any pressure    Objective:  Vitals: /57   Pulse 59   Temp 36.7 °C (98.1 °F)   Resp 16   Ht 1.575 m (5' 2\")   Wt 69.4 kg (153 lb)   LMP 2024   SpO2 100%   BMI 27.98 kg/m²     SVE: 3/60/-3     CEFM: baseline 125, moderate variability, accels present, no decels  [place]: q2-3min    Assessment/Plan:  Kassy King is a 34 y.o.  at 37w6d admitted for IOL in s/o elevated BP at term    - Now 12h s/p AROM, remains in latent labor and has not made change since last exam. Pit at 20, no issues with increasing per protocol  - CEFM, currently Category I  - No contraindications to continuing labor at this time  - Plan to recheck in 2hrs or as needed based on maternal/fetal status  - Epidural infusing    D/w Dr. Robert Schreiber MD  PGY-4, Obstetrics and Gynecology    "

## 2025-05-21 NOTE — L&D DELIVERY NOTE
Vaginal Delivery Note    Patient Name: Kassy King  : 1990  MRN: [id number]  Age: 34 y.o.    /Para:   Gestational Age: 38w0d    Date of Delivery: 2025    Procedure: Normal Spontaneous Vaginal Delivery    Delivery Provider: James Lee MD    Resident/Fellow/Other Assistant: Kelsi Schreiber MD    Description of Procedure:  Delivery of viable infant under epidural anesthesia. Delayed clamping was performed. The infant was placed taken to warmer. Cord gases were sent.  Cord blood was collected. Placenta delivered intact and fundus was firm    2nd degree laceration identified and repaired with interrupted sutures of 2-0 Vicryl to reapproximate the perineal muscles, then a running stitch of 2-0 Vicryl in the standard fashion. A running stitch of 3-0 Vicryl was placed in a subcuticular fashion at the perineum    Findings:   Amniotic fluid Clear, Female infant in Vertex Occiput Anterior presentation, APGARS 7 , 9 .  Birth Weight 2.78 kg.    Complications: None    Quantitative Blood Loss:   Delivery Blood Loss   Intrapartum & Postpartum: 25 0930 - 25 0752    Delivery Admission: 25 0846 - 25 0752         Intrapartum & Postpartum Delivery Admission    Quantitative Blood Loss - (mL) Hospital Encounter 150 mL 150 mL    Total  150 mL 150 mL            Blood products:      Uterotonics/Hemostatic Agent: IV Pitocin 30 units    Specimen:   Placenta  Delivered: 2025  4:54 AM  Appearance: Intact  Removal: Spontaneous    Disposition: discarded    Sponge/Instrument/Needle Counts: The sponge, lap and needle counts were correct.    Patient Disposition: Patient recovering on labor and delivery in stable condition.    Additional Procedures:  None    Rossy King [[id number]]      Labor Events    Sac identifier: Sac 1  Rupture date/time: 2025 1144  Rupture type: Artificial  Fluid color: Clear  Fluid odor: None  Labor type: Induced Onset of Labor  Labor allowed to  proceed with plans for an attempted vaginal birth?: Yes  Induction: Oxytocin  First cervical ripening date/time: 2025 1000  Induction date/time: 2025 0911  Induction indications: Hypertensive Disorder of Pregnancy  Complications: None       Labor Event Times    Labor onset date/time: 2025 0930  Dilation complete date/time: 2025 0359  Start pushing date/time: 2025 04:36       Labor Length    1st stage: 18h 29m  2nd stage: 0h 50m  3rd stage: 0h 05m       Placenta    Placenta delivery date/time: 2025 04:54  Placenta removal: Spontaneous  Placenta appearance: Intact  Placenta disposition: discarded       Cord    Vessels: 3 vessels  Complications: Wrapped  Cord around: trunk  Number of loops: 1  Delayed cord clamping?: Yes  Cord clamped date/time: 2025 04:49:30  Cord blood disposition: Lab, Discarded  Gases sent?: Yes  Stem cell collection (by provider): No       Lacerations    Episiotomy: None  Perineal laceration: 2nd  Perineal laceration repaired?: Yes  Other lacerations?: No  Repair suture: 2-0 Synthetic Suture, 3-0 Synthetic Suture       Anesthesia    Method: Epidural       Operative Delivery    Forceps attempted?: No  Vacuum extractor attempted?: No       Shoulder Dystocia    Shoulder dystocia present?: No       [place] Delivery    Time head delivered: 2025 04:49:00  Birth date/time: 2025 04:49:00  Delivery type: Vaginal, Spontaneous  Complications: None       Resuscitation    Method: Suctioning, Tactile stimulation       Apgars    Living status: Living  Apgar Component Scores:  1 min.:  5 min.:  10 min.:  15 min.:  20 min.:    Skin color:  0  1       Heart rate:  2  2       Reflex irritability:  2  2       Muscle tone:  1  2       Respiratory effort:  2  2       Total:  7  9       Apgars assigned by: ANGEL FREEDMAN       Delivery Providers    Delivering clinician: James Lee MD   Provider Role    Alisson Graves RN Delivery Nurse    Jocelyne Freedman RN Nursery Nurse     Kelsi Schreiber MD Resident                Kelsi Schreiber MD  PGY-4, Obstetrics and Gynecology

## 2025-05-21 NOTE — PROGRESS NOTES
05/21/25 1019   Discharge Planning   Home or Post Acute Services   (Blood Pressure Cuff)     Patient meets criteria for home monitoring of blood pressure post discharge.  Reason: past medical history of gestational hypertension, . Met with patient to assess for availability of home BP monitor.  Patient does not have access to BP monitor at home and declined obtaining BP monitor from [employer] [facility]. Patient declined a prescription being sent to her pharmacy of choice.  Patient  verbalized responsibility for obtaining her own BP monitor after discharge. Patient educated on importance of continuing to monitor BP at home, recording BP on home monitoring log and s/sx of when to call her provider.  Pt verbalized understanding the above information.    Patient will order bp cuff on [employer]

## 2025-05-21 NOTE — CARE PLAN
The patient's goals for the shift include bond with infant, rest    The clinical goals for the shift include VSS    VSS, assessments WNL, pt. Meeting all milestones.   Pt. Would like to leave on day 1     Problem: Pain - Adult  Goal: Verbalizes/displays adequate comfort level or baseline comfort level  Outcome: Progressing     Problem: Safety - Adult  Goal: Free from fall injury  Outcome: Progressing

## 2025-05-21 NOTE — LACTATION NOTE
Lactation Consultant Note  Lactation Consultation  Reason for Consult: Initial assessment  Consultant Name: Ashia Graham, RN, IBCLC    Maternal Information  Has mother  before?: Yes  How long did the mother previously breastfeed?: She EBF her daughter for a year  Infant to breast within first 2 hours of birth?: Yes  Exclusive Pump and Bottle Feed: No  WIC Program: No    Maternal Assessment  Breast Assessment: Medium, Soft, Warm, Compressible  Nipple Assessment: Intact, Erect  [place] Assessment: Normal    Infant Assessment  Infant Behavior: Deep sleep  Infant Assessment:  (d/f)    Feeding Assessment  Nutrition Source: Breastmilk  Feeding Method: Nursing at the breast  Feeding Position: C - hold, Skin to skin, Cross - cradle  Latch Assessment: No latch achieved, Too sleepy    LATCH TOOL  Latch: Too sleepy or reluctant, no latch achieved  Audible Swallowing: None  Type of Nipple: Everted (After stimulation)  Comfort (Breast/Nipple): Soft/non-tender  Hold (Positioning): Minimal assist, teach one side, mother does other, staff holds  LATCH Score: 5    Breast Pump       Other OB Lactation Tools       Patient Follow-up  Inpatient Lactation Follow-up Needed : Yes  Outpatient Lactation Follow-up: Recommended    Other OB Lactation Documentation  Infant Risk Factors: Early term birth 37-39 weeks    Recommendations/Summary  LC in room for initial lactation consult. Baby sleeping in mom's arms but mom wanting to try to latch. Baby was stripped down and placed STS with mom. No hunger cues shown. Baby is just now 12 hours old. Mom EBF her 2 y.o. for a year with no issues. LC observed mom try to latch infant. Mom demonstrated great hand positioning and holding of baby. LC tried to provide stimulation to wake up baby but baby was too sleepy to latch. LC reviewed typical  feeding behaviors in the first 24-48 hours. LC recommended mom try to latch baby every 2-3 hours. Mom is able to independently hand express  colostrum. Mom does have a pump for home. LC sized mom's nipples to be 18mm bilaterally - recommended she purchase the breast pump flange inserts for her spectra and willow pump for home. Outpatient lactation information given to mom.   LC encouraged mom to call out when baby is showing hunger cues or if she has any questions, concerns or in need of assistance.

## 2025-05-22 ENCOUNTER — APPOINTMENT (OUTPATIENT)
Dept: RADIOLOGY | Facility: HOSPITAL | Age: 35
End: 2025-05-22
Payer: COMMERCIAL

## 2025-05-22 VITALS
HEART RATE: 66 BPM | SYSTOLIC BLOOD PRESSURE: 120 MMHG | DIASTOLIC BLOOD PRESSURE: 71 MMHG | TEMPERATURE: 97 F | OXYGEN SATURATION: 97 % | HEIGHT: 62 IN | BODY MASS INDEX: 28.16 KG/M2 | WEIGHT: 153 LBS | RESPIRATION RATE: 16 BRPM

## 2025-05-22 PROBLEM — O13.3 GESTATIONAL HYPERTENSION, THIRD TRIMESTER (HHS-HCC): Status: ACTIVE | Noted: 2025-05-22

## 2025-05-22 PROCEDURE — 90471 IMMUNIZATION ADMIN: CPT

## 2025-05-22 PROCEDURE — 2500000004 HC RX 250 GENERAL PHARMACY W/ HCPCS (ALT 636 FOR OP/ED): Mod: JZ

## 2025-05-22 PROCEDURE — 90707 MMR VACCINE SC: CPT | Mod: JZ

## 2025-05-22 PROCEDURE — 2500000001 HC RX 250 WO HCPCS SELF ADMINISTERED DRUGS (ALT 637 FOR MEDICARE OP)

## 2025-05-22 RX ADMIN — MEASLES, MUMPS, AND RUBELLA VIRUS VACCINE LIVE 0.5 ML: 1000; 12500; 1000 INJECTION, POWDER, LYOPHILIZED, FOR SUSPENSION SUBCUTANEOUS at 12:12

## 2025-05-22 RX ADMIN — BUPROPION HYDROCHLORIDE 150 MG: 150 TABLET, EXTENDED RELEASE ORAL at 08:36

## 2025-05-22 RX ADMIN — ACETAMINOPHEN 975 MG: 325 TABLET ORAL at 00:58

## 2025-05-22 RX ADMIN — ACETAMINOPHEN 975 MG: 325 TABLET ORAL at 06:05

## 2025-05-22 RX ADMIN — IBUPROFEN 600 MG: 600 TABLET, FILM COATED ORAL at 06:05

## 2025-05-22 RX ADMIN — ACETAMINOPHEN 975 MG: 325 TABLET ORAL at 12:13

## 2025-05-22 RX ADMIN — IBUPROFEN 600 MG: 600 TABLET, FILM COATED ORAL at 12:14

## 2025-05-22 RX ADMIN — ESCITALOPRAM OXALATE 10 MG: 10 TABLET ORAL at 08:36

## 2025-05-22 RX ADMIN — IBUPROFEN 600 MG: 600 TABLET, FILM COATED ORAL at 01:05

## 2025-05-22 ASSESSMENT — PAIN SCALES - GENERAL
PAIN_LEVEL: 2
PAINLEVEL_OUTOF10: 0 - NO PAIN
PAINLEVEL_OUTOF10: 2

## 2025-05-22 ASSESSMENT — PAIN DESCRIPTION - DESCRIPTORS: DESCRIPTORS: CRAMPING

## 2025-05-22 NOTE — LACTATION NOTE
Lactation Consultant Note       Recommendations/Summary  I did not view a latch at this time.   Mom stated she has been independently latching the baby to the breast and denies any pain with the latch.   Mom stated she was going to try to feed the baby in a little bit; encouraged her to place baby in skin to skin and to call out for assistance with latching, if needed/ wanted.   Mom verbalized understanding.     Reviewed feeding cues, breat feeding on demand, output on different days of life, average percentage of weight loss, milk production/ supply, and the other breastfeeding education topics.      Encouraged mom to breastfeed on demand with a goal of 8-12 feeds in a 24 hour period.   If baby is not showing feeding cues and it has been 3 hours since the last time the baby was fed or the last time the baby attempted to feed, encouraged her to place baby in skin to skin.      Denies any questions or concerns at this time.     Mom has a breast pump for at home.     Encouraged her to utilize the outpatient lactation resources, if needed.   Contact information given.   [phone] [place] or [phone] [place]

## 2025-05-22 NOTE — CARE PLAN
The patient's goals for the shift include rest    The clinical goals for the shift include rest and bond with baby    Over the shift, the patient was able to rest in between breastfeeding infant.   VSS, Assessments WNL.    Plan of care ongoing.       Problem: Pain - Adult  Goal: Verbalizes/displays adequate comfort level or baseline comfort level  Outcome: Progressing     Problem: Safety - Adult  Goal: Free from fall injury  Outcome: Progressing

## 2025-05-22 NOTE — DISCHARGE SUMMARY
Postpartum Discharge Summary    Admission Date: 2025  Discharge Date: 25     Discharge Diagnosis  Problem List Items Addressed This Visit    None       Kassy King is a 34 y.o. female now  and postpartum day 1      Pregnancy notable for:  - Umbilical vein varix, weekly BPPs; unchanged from previous exams at 9.3 mm on  US  - gHTN in P2, taking baby ASA  - 1 MR BP (150/88)  in office on   - Asthma  - Depression on Lexapro and Wellbutrin  - An[facility], [facility] 9.2, [facility]    Hospital Course  Admitted for IOL in s/o elevated BP at term  Delivery Date: 2025 4:49 AM  Delivery type: Vaginal, Spontaneous   GA at delivery: 38w0d  Outcome: Living  Anesthesia during delivery: Epidural  Intrapartum complications: None  Feeding method: Breastfeeding Status: Yes     Delivery complications: none  Contraception at discharge: defers to postpartum visit    Complications Requiring Follow-Up  gHTN  - diagnosed by 2 mild range BPs > 4 hours apart  - asymptomatic  - BP normotensive on no meds  - HELLP labs negative and P:C 0.18  - I have reviewed with the patient the standard 3 day stay for hypertensive disorders and the risks/benefits of early discharge, including death, stroke, seizure, and readmission. Pt verbalized understanding, states that she feels well, and desires discharge today.  - BP cuff for home     Pertinent Subjective and Physical Exam At Time of Discharge  Patient seen at bedside - doing well and no acute events overnight. Pain well-controlled on current regimen, lochia light, voiding spontaneously, passing flatus, ambulating independently, and tolerating PO.     Vitals:    25 0917   BP: 125/77   Pulse: 69   Resp: 16   Temp: 36.7 °C (98.1 °F)   SpO2: 96%       General: well appearing, well-nourished, postpartum  Obstetric: abdomen soft/non-tender, fundus firm below umbilicus, lochia light  Skin: No rashes/lesions/erythema  Neuro: A/Ox3, conversational  GI: +flatus  Respiratory: Even  and unlabored on RA  Extremities: No edema, discoloration, or pain in BLE, equal and palpable DP and PT pulses    Psych: appropriate mood and affect     Discharge Meds     Your medication list        CONTINUE taking these medications        Instructions Last Dose Given Next Dose Due   albuterol 90 mcg/actuation inhaler           blood pressure monitor kit  Commonly known as: Blood Pressure Kit      1 each once daily.       buPROPion  mg 24 hr tablet  Commonly known as: Wellbutrin XL      TAKE 1 TABLET (150 MG) BY MOUTH ONCE DAILY IN THE MORNING.       escitalopram 10 mg tablet  Commonly known as: Lexapro      TAKE 1 TABLET (10 MG) BY MOUTH DAILY IN THE MORNING       ferrous sulfate 325 mg (65 mg elemental) tablet      Take 1 tablet (325 mg) by mouth once daily with breakfast.       loratadine 10 mg tablet  Commonly known as: Claritin           prenat.vits,bruno,min-iron-folic tablet                     Test Results Pending At Discharge  Pending Labs       No current pending labs.            Outpatient Follow-Up  Future Appointments   Date Time Provider Department Center   7/7/2025  9:45 AM James Gao MD [id number]     Patient instructed to call to schedule  2-5 days for BP check and 4-6 weeks for routine postpartum visit  with Dr. Kay.    I spent 20 minutes in the professional and overall care of this patient.      Kiley Ny PA-C  05/22/25 9:44 AM  [facility]

## 2025-05-22 NOTE — ANESTHESIA POSTPROCEDURE EVALUATION
Patient: Kassy King    Procedure Summary       Date: 25 Room / Location:     Anesthesia Start: 1355 Anesthesia Stop: 25 0449    Procedure: Labor Analgesia Diagnosis:     Scheduled Providers:  Responsible Provider: Ernestina Chu MD    Anesthesia Type: epidural ASA Status: 2            Anesthesia Type: epidural        Anesthesia Post Evaluation    Patient location during evaluation: bedside  Patient participation: complete - patient participated  Level of consciousness: awake and alert  Pain score: 2  Pain management: adequate  Airway patency: patent  Cardiovascular status: acceptable  Respiratory status: acceptable  Hydration status: acceptable  Postoperative Nausea and Vomiting: none    Kassy King is a 34 y.o., , who had a Vaginal, Spontaneous delivery on 2025 at 38w0d and is now POD1.    She had Neuraxial Anesthesia without immediate complications noted.       Pain well controlled    Vitals:    25 0325   BP: 102/66   Pulse: 63   Resp: 16   Temp: 36.1 °C (97 °F)   SpO2: 96%       Neuraxial site assessed. No visible redness or swelling or drainage. Patient able to ambulate and move all extremities without difficulty. Able to void. No complaints of nausea/vomiting. Tolerating PO intake well. No s/sx of PDPH.     Anesthesia will sign off     Monica Ko APRN-CRNA      No notable events documented.

## 2025-05-22 NOTE — CARE PLAN
The patient's goals for the shift include discharge home    The clinical goals for the shift include discharge home    Problem: Pain - Adult  Goal: Verbalizes/displays adequate comfort level or baseline comfort level  Outcome: Met     Problem: Safety - Adult  Goal: Free from fall injury  Outcome: Met     Patient has had stable VS and assessments, pain well controlled and bleeding has been appropriate during this shift.  Patient bonding with  and breastfeeding. Patient cleared for discharge. AVS printed and discharge education complete

## 2025-05-23 LAB
BLOOD EXPIRATION DATE: NORMAL
DISPENSE STATUS: NORMAL
PRODUCT BLOOD TYPE: 5100
PRODUCT CODE: NORMAL
UNIT ABO: NORMAL
UNIT NUMBER: NORMAL
UNIT RH: NORMAL
UNIT VOLUME: 277
XM INTEP: NORMAL

## 2025-06-05 ENCOUNTER — LACTATION CONSULT (OUTPATIENT)
Dept: LACTATION | Facility: CLINIC | Age: 35
End: 2025-06-05
Payer: COMMERCIAL

## 2025-06-05 DIAGNOSIS — O92.70 LACTATION DISORDER (HHS-HCC): Primary | ICD-10-CM

## 2025-06-05 ASSESSMENT — ENCOUNTER SYMPTOMS
DEPRESSION: 0
LOSS OF SENSATION IN FEET: 0
OCCASIONAL FEELINGS OF UNSTEADINESS: 0

## 2025-06-05 NOTE — PROGRESS NOTES
Lactation Counseling Note    Subjective:    Kassy King is a 34 y.o. patient referred for lactation counseling. She is here today due to Breast pumping concerns/issues, Latch issues, and slow weight gain. She was referred by her Pediatrician Jimmy.     OB HISTORY:   Healthcare Provider: OB/GYN Rahul  Prenatal Screening: Abnormal  GHTN, polyhydramnios  Maternal Blood Type: O positive  /Para: : 3  Para: 2  Weeks Gestation: 38  Mode of Delivery: Normal vaginal route  Induction/Augmentation  Epidural/General Anesthesia  Delivery Complications: cord around body  Maternal Complications: None   Information: Baby's Name: Salima Perales  : 25  MRN: [id number]  [place] [facility] [place]  Healthcare Provider: Jimmy  APGARS: 1 minute: 7  5 minutes: 9  Skin to skin contact: First hour  Infant's blood type: O positive  Birth parameters: AGA  Birth weight: 2780 gm  Birth length: 48 cm  Discharge weight: 2650 gm  Complications: no concerns, jaundice, no phototherapy  Sleepy latch from birth    Objective:    BREASTFEEDING ASSESSMENT:   Breast Assessment: Medium, Symmetrical, Soft, and Compressible  Nipple Assessment/Stage: intact, erect, and rounded after feeding nontender  Areola: Normal  Feeding Position: breast sandwich, cross - cradle, football/seated, skin to skin, both sides, switch nursing, mother needs assistance with latch/positioning, and nose or chin not touching breast  Latch: minimal assistance is needed, instructed on deep latch, eagerly grasped on to latch, shallow latch, mouth not open wide enough, comfortable with no pain, sucking and swallowing, chin and lower lip contact breast first, bursts of sucking, swallowing, and rest, lower lip turned in, flanged lips, chin moves in rhythmic motion, and minimal audible swallowing  Suck/Feeding: sustained, coordinated suck/swallow/breathe, audible swallowing, choking/gulping, and supplemented breast  Alternative Feeding Methods: nursing at the  breast, feeding expressed breast milk, and paced bottle  Feeding and Cleaning instructions reviewed for: Paced bottle  Infant Feeding Method: Breast milk only  Infant Behavior: awake, sleepy, readiness to feed, feeding cues observed, rooting response, and suckles on and off, needs stimulation  Infant Information: Receding chin  Palate- high/arch/bubble/normal  Poor occlusion of lips around areola  Tongue protrudes over alveolar ridge  Tongue humped/bunched/retracted/elevated  Good cupping of tongue  Good lateral movement of the tongue  Able to elevate tongue to roof of mouth  Fontanel: flat  Mucous Membranes: moist  Breast Pain: Pain scale 0-10 (10 most pain): 0  Nipple Pain: Pain scale 0-10 (10 most pain): 0  Weight: Reported pediatrician visit weight: 2699 gm  Date of pediatrician weight: 25  Weight before feedin gm  Weight after feedin gm  Amount of breast milk transferred: 45 ml  Number of voids in the last 24 hours: 10  Number of stools in the last 24 hours: 2  Infant took         PATIENT DISCUSSION SUMMARY:  .  .  Mother and infant seen for concerns over slow weight gain.  Mother reports latch as comfortable but not feeling like breasts are being drain.    Mother has been feeding on demand.   Mother's milk in and milk easily expressible.      Infant weight loss since pediatric visit yesterday, but weighed on different scale.  Infant alert on exam but sleepy with feeding with moist mucous membranes.  Wet diaper in office.    Mother's nipples erect and breasts soft. Infant able to sustain latch.  Intermittent loss of suction.  Mother reports latch as comfortable and rounded after feeing.    Suck assessment abnormal: reveals infant with tight maxillary frenulum, and curls upper lip, but lip able to be everted.  Infant with good cupping and extension of tongue, with posterior humping of tongue, and good lateralization at this exam.  Infant able to elevate tongue to roof of mouth. Infant with  occasional retraction and biting on finger, but not felt at breast. Palate high and intact to palpation.    Discussed to bottle feed with more upright positioning and apolinar lips to help infant have wide latch when using bottle by pulling down on chin and everting upper lip.    Mother instructed on suck training exercises.    Pumping session observed with  Spectra S2    pump with    21  mm flange inserts  Discussed increasing vacuum to comfort, use of massage and compression, and hand expression after pumping to improve breast emptying.  Mother shown technique for hand expression. Pumping out approximately   3 oz in 20   minutes.    Reviewed post birth warning signs and safe sleep.    Plan:  Mother to do suck training exercises prior to every feed.  Feed at least 8-12 times daily, both breasts for as long as infant is actively sucking and swallowing Limit to 10 minutes each side unless hearing active swallowing..  Use massage and compression to aid transfer.      After each feeding, mother to pump both breasts at same time for 15-20 minutes, using massage and compression, with hand expression after to fully drain breast. Feed expressed milk to infant until satisfied.    If suck training exercises do not improve latch, infant needs to be evaluated by ENT or pediatric dentist for tight posterior frenulum, and to evaluate maxillary frenulum.    Will f/u with lactation as needed and with pediatrician. 6/13/25  This is a transfer issue not a supply issue.  Denies questions.  LACTATION PROBLEMS AND STRATEGIES:  Problems latching baby to breast: Baby should latch to areola (dark area) not just the nipple.  Baby's lips should be flanged outward.  Bring the baby up to the level of your breast by putting a pillow under the baby.  Dribble milk over the nipple or express milk so that baby can taste it  Encourage a deeper latch with the asymetrical latch- lining baby's nose opposite mother's nipple.  Gently tickle your baby's lip  "with your nipple to encourage your baby to open his/her mouth wide.  Hold baby so that your breast is positioned deep in the baby's mouth.  Hold your baby close, tummy to tummy.  If baby does not latch to breast, express breast milk.  If the baby is not latched on well, break seal and gently try again.  Keep baby skin to skin and watch for feeding cues.  Massage breast to start milk-ejection reflex.  Place nipple and at least 1 inch of areola into baby's mouth.  Place your hand behind the baby's neck and shoulder.  Do not force baby's head into breast.  Put baby in the football hold or clutch hold, supporting his/her neck and head in sniffing position to open his/her throat.  Shape breast into oval shape (sandwich hold)  for deep latch.  Try different feeding positions (cradle, football, side etc.).  Use a breast feeding pillow to bring baby up to the level of the breast.  Use semi-reclined feeding position to allow baby to take an active role and trigger baby's inborn feeding behavior.  Use your hand to support your breast during feeding.  When your baby's mouth is wide open, quickly pull baby into your breast.  Your baby's chin should be pressed into your breast.  Pumping pointers: Air dry nipples, express milk and rub in or use an approved nipple cream after expressing., Apply heat to breasts before pumping., Choose a familiar comfortable place to express milk., If nipples are sore use less pressure and pump more frequently for shorter times., Listen to a tape of baby while pumping., Look at a photo of baby wile expressing., Massage breasts before and during pumping., Minimize distractions., Moisten flange before beginning to improve seal., Pick a good time of day to begin (early in a.m., during the baby's long sleep stretch, when skipping a feeding, etc.)., Relax for 5 minutes before pumping., Stimulate the nipples and hand express a few drops before pumping., and Use pumping bra/band for \"hands free\" " pumping.  PATIENT INSTRUCTION HANDOUTS GIVEN:   Tips for pumping with an electric breast pump and milk storage for your healthy baby (PI# 123)  Suck training (PI# 176)  How to keep your breast pump kit clean  Foods that promote good milk production  Breastfeeding: Tips to increase your milk supply (PI# 162)  Breastfeeding: Plugged milk ducts/mastitis (PI# 164)  Breast massage with milk expression by hand (PI# 728)  LACTATION EDUCATION:  Correct Positioning, Correct Latch On, and Demo use of Pump

## 2025-06-05 NOTE — PATIENT INSTRUCTIONS
PATIENT DISCUSSION SUMMARY:  .  .  Mother and infant seen for concerns over slow weight gain.  Mother reports latch as comfortable but not feeling like breasts are being drain.    Mother has been feeding on demand.   Mother's milk in and milk easily expressible.      Infant weight loss since pediatric visit yesterday, but weighed on different scale.  Infant alert on exam but sleepy with feeding with moist mucous membranes.  Wet diaper in office.    Mother's nipples erect and breasts soft. Infant able to sustain latch.  Intermittent loss of suction.  Mother reports latch as comfortable and rounded after feeing.    Suck assessment abnormal: reveals infant with tight maxillary frenulum, and curls upper lip, but lip able to be everted.  Infant with good cupping and extension of tongue, with posterior humping of tongue, and good lateralization at this exam.  Infant able to elevate tongue to roof of mouth. Infant with occasional retraction and biting on finger, but not felt at breast. Palate high and intact to palpation.    Discussed to bottle feed with more upright positioning and apolinar lips to help infant have wide latch when using bottle by pulling down on chin and everting upper lip.    Mother instructed on suck training exercises.    Pumping session observed with  Spectra S2    pump with    21  mm flange inserts  Discussed increasing vacuum to comfort, use of massage and compression, and hand expression after pumping to improve breast emptying.  Mother shown technique for hand expression. Pumping out approximately   3 oz in 20   minutes.    Reviewed post birth warning signs and safe sleep.    Plan:  Mother to do suck training exercises prior to every feed.  Feed at least 8-12 times daily, both breasts for as long as infant is actively sucking and swallowing Limit to 10 minutes each side unless hearing active swallowing..  Use massage and compression to aid transfer.      After each feeding, mother to pump both  breasts at same time for 15-20 minutes, using massage and compression, with hand expression after to fully drain breast. Feed expressed milk to infant until satisfied.    If suck training exercises do not improve latch, infant needs to be evaluated by ENT or pediatric dentist for tight posterior frenulum, and to evaluate maxillary frenulum.    Will f/u with lactation as needed and with pediatrician. 6/13/25    This is a transfer issue not a supply issue.  Denies questions.  LACTATION PROBLEMS AND STRATEGIES:  Problems latching baby to breast: Baby should latch to areola (dark area) not just the nipple.  Baby's lips should be flanged outward.  Bring the baby up to the level of your breast by putting a pillow under the baby.  Dribble milk over the nipple or express milk so that baby can taste it  Encourage a deeper latch with the asymetrical latch- lining baby's nose opposite mother's nipple.  Gently tickle your baby's lip with your nipple to encourage your baby to open his/her mouth wide.  Hold baby so that your breast is positioned deep in the baby's mouth.  Hold your baby close, tummy to tummy.  If baby does not latch to breast, express breast milk.  If the baby is not latched on well, break seal and gently try again.  Keep baby skin to skin and watch for feeding cues.  Massage breast to start milk-ejection reflex.  Place nipple and at least 1 inch of areola into baby's mouth.  Place your hand behind the baby's neck and shoulder.  Do not force baby's head into breast.  Put baby in the football hold or clutch hold, supporting his/her neck and head in sniffing position to open his/her throat.  Shape breast into oval shape (sandwich hold)  for deep latch.  Try different feeding positions (cradle, football, side etc.).  Use a breast feeding pillow to bring baby up to the level of the breast.  Use semi-reclined feeding position to allow baby to take an active role and trigger baby's inborn feeding behavior.  Use your  "hand to support your breast during feeding.  When your baby's mouth is wide open, quickly pull baby into your breast.  Your baby's chin should be pressed into your breast.  Pumping pointers: Air dry nipples, express milk and rub in or use an approved nipple cream after expressing., Apply heat to breasts before pumping., Choose a familiar comfortable place to express milk., If nipples are sore use less pressure and pump more frequently for shorter times., Listen to a tape of baby while pumping., Look at a photo of baby wile expressing., Massage breasts before and during pumping., Minimize distractions., Moisten flange before beginning to improve seal., Pick a good time of day to begin (early in a.m., during the baby's long sleep stretch, when skipping a feeding, etc.)., Relax for 5 minutes before pumping., Stimulate the nipples and hand express a few drops before pumping., and Use pumping bra/band for \"hands free\" pumping.  PATIENT INSTRUCTION HANDOUTS GIVEN:   Tips for pumping with an electric breast pump and milk storage for your healthy baby (PI# 123)  Suck training (PI# 176)  How to keep your breast pump kit clean  Foods that promote good milk production  Breastfeeding: Tips to increase your milk supply (PI# 162)  Breastfeeding: Plugged milk ducts/mastitis (PI# 164)  Breast massage with milk expression by hand (PI# 728)  LACTATION EDUCATION:  Correct Positioning, Correct Latch On, and Demo use of Pump       "

## 2025-06-06 ENCOUNTER — TELEPHONE (OUTPATIENT)
Dept: LACTATION | Facility: CLINIC | Age: 35
End: 2025-06-06
Payer: COMMERCIAL

## 2025-06-06 NOTE — LACTATION NOTE
Follow up phone call for lactation visit. Feeding every 2 -3 hours and taking 1.5-2 oz of expressed milk via bottle after feedings. Has noticed diapers being slightly wetter no increase in stolling yet.  Has done suck training but not consistently. Encouraged suck trainingDiscussed that if infant starts to transfer milk better from breast, I would anticipate would want less from bottle after breastfeeding and that patient will pump out less milk. Has appointment with Dr. Marquez 6/13/25 to evaluate weight.  Will follow up with lactation as needed.

## 2025-06-17 ENCOUNTER — APPOINTMENT (OUTPATIENT)
Dept: DERMATOLOGY | Facility: CLINIC | Age: 35
End: 2025-06-17
Payer: COMMERCIAL

## 2025-06-17 DIAGNOSIS — L70.0 ACNE VULGARIS: Primary | ICD-10-CM

## 2025-06-17 PROCEDURE — 99203 OFFICE O/P NEW LOW 30 MIN: CPT

## 2025-06-17 ASSESSMENT — DERMATOLOGY PATIENT ASSESSMENT
DO YOU HAVE IRREGULAR MENSTRUAL CYCLES: YES
DO YOU USE SUNSCREEN: OCCASIONALLY
HAVE YOU HAD OR DO YOU HAVE A STAPH INFECTION: NO
HAVE YOU HAD OR DO YOU HAVE VASCULAR DISEASE: NO
DO YOU HAVE ANY NEW OR CHANGING LESIONS: NO
ARE YOU TRYING TO GET PREGNANT: NO
ARE YOU AN ORGAN TRANSPLANT RECIPIENT: NO
ARE YOU ON BIRTH CONTROL: NO
DO YOU USE A TANNING BED: NO

## 2025-06-17 ASSESSMENT — DERMATOLOGY QUALITY OF LIFE (QOL) ASSESSMENT
RATE HOW EMOTIONALLY BOTHERED YOU ARE BY YOUR SKIN PROBLEM (FOR EXAMPLE, WORRY, EMBARRASSMENT, FRUSTRATION): 0 - NEVER BOTHERED
RATE HOW BOTHERED YOU ARE BY EFFECTS OF YOUR SKIN PROBLEMS ON YOUR ACTIVITIES (EG, GOING OUT, ACCOMPLISHING WHAT YOU WANT, WORK ACTIVITIES OR YOUR RELATIONSHIPS WITH OTHERS): 0 - NEVER BOTHERED
RATE HOW BOTHERED YOU ARE BY SYMPTOMS OF YOUR SKIN PROBLEM (EG, ITCHING, STINGING BURNING, HURTING OR SKIN IRRITATION): 0 - NEVER BOTHERED
ARE THERE EXCLUSIONS OR EXCEPTIONS FOR THE QUALITY OF LIFE ASSESSMENT: NO

## 2025-06-17 NOTE — Clinical Note
-Discussed diagnosis of acne  -We discussed treatment options at today's visit, however, the patient is currently breastfeeding. She does not think she will be breastfeeding much longer. If that is the case, she will reach out to the office and I will send the prescriptions to the pharmacy.     -Recommend:  -No treatment at this time.    -Once she is finished breastfeeding,  we can start the following treatment regimen:  -Start Benzoyl Peroxide 5% wash daily in the morning.  -Start Clindamycin 1% lotion daily in the morning.  -Start Tretinoin 0.025% cream at bedtime. Start 2-3 nights a week and increase to nightly as tolerated.     -Recommend using a noncomedogenic moisturizer to help with dryness.  -If using a cleanser in the evening, I recommend a gentle cleanser such as Cetaphil or Cerave.     The risks, benefits, and side effects of these medications, including the redness, dryness, and irritation expected with Tretinoin use, were discussed. I informed the patient it will likely take at least 2-3 months to notice any significant improvement with the treatment regimen outlined above.    -Will plan to follow up 3 months after starting treatment.

## 2025-06-17 NOTE — PROGRESS NOTES
Subjective     Kassy King is a 34 y.o. female who presents for the following: Acne (Acne on face , long time pustules ,comedones some cystic ). Acne on the face, ongoing for years. She gets occasional breakouts on the back and chest when she is working out and sweating. Has completed 2 rounds of Accutane in the past when she was in college. She also has a topical retinoid at home but she has not been using it. She recently had a baby and is currently breastfeeding.       Review of Systems:  No other skin or systemic complaints other than what is documented elsewhere in the note.    The following portions of the chart were reviewed this encounter and updated as appropriate:       Skin Cancer History  Biopsy Log Book  No skin cancers from Specimen Tracking.    Additional History      Specialty Problems          Dermatology Problems    Acne     Past Medical History:  Kassy King  has a past medical history of Lactose intolerance and WPW (Omari-Parkinson-White syndrome) (10/04/2022).    Past Surgical History:  Kassy King  has a past surgical history that includes D&C first trimester / Tx incomplete / missed / septic / induced ; Ablation of dysrhythmic focus; Tonsillectomy; and Ransom tooth extraction.    Family History:  Patient family history includes Alcohol abuse in her father and mother; Asthma in her mother; Hyperlipidemia in her father and mother; Hypertension in her father.       Objective   Well appearing patient in no apparent distress; mood and affect are within normal limits.    A focused examination was performed. All findings within normal limits unless otherwise noted below.    Assessment/Plan   Skin Exam  1. ACNE VULGARIS  Head - Anterior (Face)  Scattered comedones on the nose and glabella.  -Discussed diagnosis of acne  -We discussed treatment options at today's visit, however, the patient is currently breastfeeding. She does not think she will be breastfeeding much longer. If that  is the case, she will reach out to the office and I will send the prescriptions to the pharmacy.     -Recommend:  -No treatment at this time.    -Once she is finished breastfeeding,  we can start the following treatment regimen:  -Start Benzoyl Peroxide 5% wash daily in the morning.  -Start Clindamycin 1% lotion daily in the morning.  -Start Tretinoin 0.025% cream at bedtime. Start 2-3 nights a week and increase to nightly as tolerated.     -Recommend using a noncomedogenic moisturizer to help with dryness.  -If using a cleanser in the evening, I recommend a gentle cleanser such as Cetaphil or Cerave.     The risks, benefits, and side effects of these medications, including the redness, dryness, and irritation expected with Tretinoin use, were discussed. I informed the patient it will likely take at least 2-3 months to notice any significant improvement with the treatment regimen outlined above.    -Will plan to follow up 3 months after starting treatment.

## 2025-07-07 ENCOUNTER — APPOINTMENT (OUTPATIENT)
Dept: DERMATOLOGY | Facility: CLINIC | Age: 35
End: 2025-07-07
Payer: COMMERCIAL

## 2025-07-15 ENCOUNTER — APPOINTMENT (OUTPATIENT)
Dept: OBSTETRICS AND GYNECOLOGY | Facility: CLINIC | Age: 35
End: 2025-07-15
Payer: COMMERCIAL

## 2025-07-15 VITALS — SYSTOLIC BLOOD PRESSURE: 109 MMHG | DIASTOLIC BLOOD PRESSURE: 74 MMHG | BODY MASS INDEX: 24.69 KG/M2 | WEIGHT: 135 LBS

## 2025-07-15 DIAGNOSIS — O13.3 GESTATIONAL HYPERTENSION, THIRD TRIMESTER (HHS-HCC): ICD-10-CM

## 2025-07-15 PROCEDURE — 0503F POSTPARTUM CARE VISIT: CPT | Performed by: STUDENT IN AN ORGANIZED HEALTH CARE EDUCATION/TRAINING PROGRAM

## 2025-07-15 SDOH — ECONOMIC STABILITY: TRANSPORTATION INSECURITY
IN THE PAST 12 MONTHS, HAS LACK OF TRANSPORTATION KEPT YOU FROM MEETINGS, WORK, OR FROM GETTING THINGS NEEDED FOR DAILY LIVING?: NO

## 2025-07-15 SDOH — ECONOMIC STABILITY: TRANSPORTATION INSECURITY
IN THE PAST 12 MONTHS, HAS THE LACK OF TRANSPORTATION KEPT YOU FROM MEDICAL APPOINTMENTS OR FROM GETTING MEDICATIONS?: NO

## 2025-07-15 ASSESSMENT — EDINBURGH POSTNATAL DEPRESSION SCALE (EPDS)
I HAVE BEEN ABLE TO LAUGH AND SEE THE FUNNY SIDE OF THINGS: AS MUCH AS I ALWAYS COULD
I HAVE BEEN ANXIOUS OR WORRIED FOR NO GOOD REASON: YES, SOMETIMES
THE THOUGHT OF HARMING MYSELF HAS OCCURRED TO ME: NEVER
THINGS HAVE BEEN GETTING ON TOP OF ME: NO, MOST OF THE TIME I HAVE COPED QUITE WELL
I HAVE BEEN SO UNHAPPY THAT I HAVE HAD DIFFICULTY SLEEPING: NOT AT ALL
I HAVE FELT SAD OR MISERABLE: NO, NOT AT ALL
I HAVE BEEN SO UNHAPPY THAT I HAVE BEEN CRYING: NO, NEVER
TOTAL SCORE: 4
I HAVE LOOKED FORWARD WITH ENJOYMENT TO THINGS: AS MUCH AS I EVER DID
I HAVE FELT SCARED OR PANICKY FOR NO GOOD REASON: NO, NOT MUCH
I HAVE BLAMED MYSELF UNNECESSARILY WHEN THINGS WENT WRONG: NO, NEVER

## 2025-07-15 ASSESSMENT — PAIN SCALES - GENERAL: PAINLEVEL_OUTOF10: 0-NO PAIN

## 2025-07-15 ASSESSMENT — PATIENT HEALTH QUESTIONNAIRE - PHQ9
2. FEELING DOWN, DEPRESSED OR HOPELESS: NOT AT ALL
SUM OF ALL RESPONSES TO PHQ9 QUESTIONS 1 & 2: 0
1. LITTLE INTEREST OR PLEASURE IN DOING THINGS: NOT AT ALL

## 2025-07-15 NOTE — PROGRESS NOTES
Post Partum Visit  Bob King is a 34 y.o.  presenting for postpartum follow-up:    Delivery Date: 2025  GA at Delivery: 38.0  Type of Delivery: Vaginal, Spontaneous c/b gHTN (not on meds)     Pregnancy was complicated by:  Pregnancy Problems (from 24 to present)       Problem Noted Diagnosed Resolved    Elevated blood pressure affecting pregnancy in third trimester, antepartum 2025 by Enedina Kay MD  No    Priority:  Medium       Overview Signed 2025  4:37 PM by Enedina Kay MD   /88 at 37.5wga office visit.          Umbilical vein abnormality affecting pregnancy 2025 by Enedina Kay MD  No    Priority:  Medium       Overview Signed 2025  1:19 PM by Enedina Kay MD   32 weeks: umbilical vein varix measuring 1.3 x 1.3 x 1.2 cm . Recommended weekly BPPs with DV Dopplers          Polyhydramnios in third trimester ([facility]) 2025 by Enedina Kay MD  No    Priority:  Medium       Overview Addendum 2025  4:34 PM by Enedina Kay MD   mild polyhydramnios is noted with a MVP of8.2 and ANASTASIA of 25.7cm   25.4cm on recent US, stable.          Fundal height low for dates in third trimester ([facility]) 3/24/2025 by Enedina Kay MD  No    Priority:  Medium       Overview Signed 3/24/2025  9:16 AM by Enedina Kay MD   29wks measuring 24wks fundal height. Had IUGR baby in P1, however likely constitutionally small given maternal & paternal size.          Pelvic pain affecting pregnancy in third trimester, antepartum ([facility]) 3/24/2025 by Enedina Kay MD  No    Priority:  Medium       Overview Signed 3/24/2025  9:15 AM by Enedina Kay MD   Similar to previous pregnancy, open to PT.          37 weeks gestation of pregnancy ([facility]) 2024 by Enedina Kay MD  No    Priority:  Medium       Overview Addendum 2025  4:46 PM by Enedina Kay MD   Desired provider in labor: [] CNM  [x] Physician  [x] Blood Products: [x] Yes,  "accepts [] No, needs counseling  [x] Initial BMI: Could not be calculated   [x] Prenatal Labs: reviewed  [x] Cervical Cancer Screening up to date  [x] Rh status: +  [x] Genetic Screening: [facility] reviewed  [x] NT US: (11-13 wks) wnl  [x] Baby ASA (if indicated): taking, had pp gHTN in P2  [x] Pregnancy dated by: LMP cw 7wk US    [x] Anatomy US: (19-20 wks) reviewed  [] Federal Sterilization consent signed (if indicated):  [x] 1hr GCT at 24-28wks: 106  [x] Fetal Surveillance (if indicated): weekly BPPs for umbilical vein varix  [x] Tdap (27-32 wks, may be given up to 36 wks if initial window missed): 3/24  [x] Flu Vaccine: pt reports did get this year    [] Breastfeeding:  [] Postpartum Birth control method:   [x] GBS at 36 - 37 wks: neg  [x] 39 weeks discussion of IOL vs. Expectant management: will do IOL for elevated BP, timing pending scheduling  [x] Mode of delivery ( anticipated ): vaginal         Asthma affecting pregnancy in first trimester ([facility]) 2024 by Enedina Kay MD  No    Priority:  Medium       Overview Addendum 2024  4:03 PM by Enedina Kay MD   Triggered by allergies, pet dander.   prn antihistamine and albuterol.         Depression affecting pregnancy in first trimester, antepartum ([facility]) 2024 by Enedina Kay MD  No    Priority:  Medium       Overview Signed 2024  3:28 PM by Enedina Kay MD   on escitalopram 10mg daily & Wellbutrin 150mg daily         Hx of maternal laceration, 3rd degree, currently pregnant ([facility]) 2024 by Enedina Kay MD  No    Priority:  Medium       Overview Signed 2024  4:04 PM by Enedina Kay MD   Was told she had a \"big 2nd degree\" but she is concerned it was a 3rd.  Discussed option of having UroGyn do her perineal repair this time if that makes her feel more comfortable.           (normal spontaneous vaginal delivery) ([facility]) 2025 by Kiley M Drakos, PA-C  No    Gestational hypertension, third trimester " ([facility]) 2025 by Kiley Ny PA-C  No    Vaginal discharge during pregnancy in second trimester ([facility]) 2024 by Enedina Kay MD  2/3/2025 by Enedina Kay MD    Priority:  Medium       14 weeks gestation of pregnancy ([facility]) 2024 by Enedina Kay MD  2024 by Enedina Kay MD    Priority:  Medium               Concerns: none  Pain: none  Lacerations:   Laceration Repaired?   Perineal: 2nd Yes   Periurethral:       Labial:       Sulcus:       Vaginal:       Cervical:           Repair suture: 2-0 Synthetic Suture;3-0 Synthetic Suture   Number of repair packets:     Blood loss (mL):     Total estimated blood loss (mL): 0     Menses: No  Sexual Health Concerns: No  Contraceptive Method: None  Feeding Method: She is breast feeding exclusively. no breast or nursing problems and infant having trouble with swallowing/transfer  Lactation Consult Needed?: No saw lactation already  Birth Trauma: No  Baby:   Information for the patient's :  Salima Perales [[id number]]   Salima Perales,   Information for the patient's :  Salima Perales [[id number]]   female  Bonding: doing well without issue  Mood:   feels well! Does not feel as though she needs to go up on her meds.         Objective   Vitals:    07/15/25 0944   BP: 109/74        Physical Exam  Constitutional:       Appearance: Normal appearance.   HENT:      Head: Normocephalic and atraumatic.      Mouth/Throat:      Mouth: Mucous membranes are moist.   Eyes:      Extraocular Movements: Extraocular movements intact.      Conjunctiva/sclera: Conjunctivae normal.      Pupils: Pupils are equal, round, and reactive to light.   Pulmonary:      Effort: Pulmonary effort is normal.   Abdominal:      General: Abdomen is flat.      Palpations: Abdomen is soft.   Musculoskeletal:         General: Normal range of motion.      Cervical back: Normal range of motion.   Neurological:      General: No focal deficit present.      Mental  Status: She is alert.   Skin:     General: Skin is warm and dry.   Psychiatric:         Mood and Affect: Mood normal.         Behavior: Behavior normal.         Thought Content: Thought content normal.         Judgment: Judgment normal.              Assessment/Plan   Problem List Items Addressed This Visit           ICD-10-CM    Elevated blood pressure affecting pregnancy in third trimester, antepartum O16.3    Routine postpartum follow-up - Primary Z39.2       Options for postpartum family planning were discussed. These included combination oral contraceptive options, progesterone only pills, implantable contraception, intrauterine devices, barrier methods, sterilization, and the rhythm method. The patient's questions were answered. She decided on natural family planning as her preferred method of contraception however may be interested in Slynd vs hormonal IUD, will notify when decided.     Follow up: annual and prn     Enedina Kay MD

## 2025-07-15 NOTE — LETTER
Date: July 15, 2025  RE:  Kassy King  :  1990      To Whom It May Concern:    Our patient, Kassy, has been under our care and now may return back to work without restrictions.    Their return to work date is:  25    If you have questions concerning this patient's immediate care, please feel free to contact our office at [phone].    Sincerely,      Enedina Kay MD  Obstetrics and Gynecology

## 2025-07-28 ENCOUNTER — APPOINTMENT (OUTPATIENT)
Dept: PHYSICAL THERAPY | Facility: CLINIC | Age: 35
End: 2025-07-28
Payer: COMMERCIAL

## 2025-07-28 ENCOUNTER — TELEPHONE (OUTPATIENT)
Dept: LACTATION | Facility: CLINIC | Age: 35
End: 2025-07-28
Payer: COMMERCIAL

## 2025-07-28 NOTE — LACTATION NOTE
Kassy is noticing her baby is gagging and having trouble with bottle feeding. She is bottle feeding expressed breast milk in [employer] Natural Wave bottles and the baby is in the 2nd percentile for weight. Kassy is inquiring information on her baby possibly having a tongue tie as previously discussed at a lactation consultation. Advised Kassy to get the tongue tie evaluated by a provider who is able to evaluate tight oral tissue such as an ENT, Breastfeeding Medicine Specialist, or Pediatric Dentist of her choice. Encouraged Kassy to call the outpatient lactation number ([phone]) for follow up care 1-3 days following a tongue tie release. Kassy verbalized understanding.

## 2025-08-20 ENCOUNTER — DOCUMENTATION (OUTPATIENT)
Dept: UROLOGY | Facility: HOSPITAL | Age: 35
End: 2025-08-20
Payer: COMMERCIAL

## 2025-08-20 DIAGNOSIS — Z00.6 RESEARCH STUDY PATIENT: Primary | ICD-10-CM

## 2025-09-02 DIAGNOSIS — L70.0 ACNE VULGARIS: Primary | ICD-10-CM

## 2025-09-02 RX ORDER — BENZOYL PEROXIDE 50 MG/ML
1 LIQUID TOPICAL DAILY
Qty: 236 ML | Refills: 3 | Status: SHIPPED | OUTPATIENT
Start: 2025-09-02 | End: 2026-09-02

## 2025-09-02 RX ORDER — CLINDAMYCIN PHOSPHATE 10 UG/ML
LOTION TOPICAL DAILY
Qty: 60 ML | Refills: 3 | Status: SHIPPED | OUTPATIENT
Start: 2025-09-02 | End: 2026-09-02

## 2025-09-02 RX ORDER — TRETINOIN 0.25 MG/G
CREAM TOPICAL
Qty: 45 G | Refills: 3 | Status: SHIPPED | OUTPATIENT
Start: 2025-09-02

## 2025-12-16 ENCOUNTER — APPOINTMENT (OUTPATIENT)
Dept: DERMATOLOGY | Facility: CLINIC | Age: 35
End: 2025-12-16
Payer: COMMERCIAL